# Patient Record
Sex: FEMALE | Race: BLACK OR AFRICAN AMERICAN | NOT HISPANIC OR LATINO | RURAL
[De-identification: names, ages, dates, MRNs, and addresses within clinical notes are randomized per-mention and may not be internally consistent; named-entity substitution may affect disease eponyms.]

---

## 2020-07-31 ENCOUNTER — HISTORICAL (OUTPATIENT)
Dept: ADMINISTRATIVE | Facility: HOSPITAL | Age: 20
End: 2020-07-31

## 2020-10-07 ENCOUNTER — HISTORICAL (OUTPATIENT)
Dept: ADMINISTRATIVE | Facility: HOSPITAL | Age: 20
End: 2020-10-07

## 2020-10-19 ENCOUNTER — HISTORICAL (OUTPATIENT)
Dept: ADMINISTRATIVE | Facility: HOSPITAL | Age: 20
End: 2020-10-19

## 2020-10-19 LAB
BILIRUB UR QL STRIP: ABNORMAL MG/DL
CLARITY UR: ABNORMAL
COLOR UR: ABNORMAL
GLUCOSE UR STRIP-MCNC: NEGATIVE MG/DL
KETONES UR STRIP-SCNC: NEGATIVE MG/DL
LEUKOCYTE ESTERASE UR QL STRIP: NEGATIVE LEU/UL
NITRITE UR QL STRIP: NEGATIVE
PH UR STRIP: 6 PH UNITS (ref 5–8)
PROT UR QL STRIP: 30 MG/DL
RBC # UR STRIP: NEGATIVE ERY/UL
SP GR UR STRIP: >1.03 (ref 1–1.03)
UROBILINOGEN UR STRIP-ACNC: 0.2 MG/DL

## 2020-10-20 ENCOUNTER — HISTORICAL (OUTPATIENT)
Dept: ADMINISTRATIVE | Facility: HOSPITAL | Age: 20
End: 2020-10-20

## 2020-10-20 LAB
BILIRUB UR QL STRIP: NEGATIVE MG/DL
CLARITY UR: CLEAR
COLOR UR: YELLOW
GLUCOSE UR STRIP-MCNC: NEGATIVE MG/DL
KETONES UR STRIP-SCNC: NEGATIVE MG/DL
LEUKOCYTE ESTERASE UR QL STRIP: NEGATIVE LEU/UL
NITRITE UR QL STRIP: NEGATIVE
PH UR STRIP: 7 PH UNITS (ref 5–8)
PROT UR QL STRIP: ABNORMAL MG/DL
RBC # UR STRIP: NEGATIVE ERY/UL
SP GR UR STRIP: 1.02 (ref 1–1.03)
UROBILINOGEN UR STRIP-ACNC: 4 EU/DL

## 2020-11-03 ENCOUNTER — HISTORICAL (OUTPATIENT)
Dept: ADMINISTRATIVE | Facility: HOSPITAL | Age: 20
End: 2020-11-03

## 2020-11-04 ENCOUNTER — HISTORICAL (OUTPATIENT)
Dept: ADMINISTRATIVE | Facility: HOSPITAL | Age: 20
End: 2020-11-04

## 2020-11-04 LAB — FIBRONECTIN FETAL SPEC QL: NEGATIVE

## 2020-11-12 ENCOUNTER — HISTORICAL (OUTPATIENT)
Dept: ADMINISTRATIVE | Facility: HOSPITAL | Age: 20
End: 2020-11-12

## 2020-11-19 ENCOUNTER — HISTORICAL (OUTPATIENT)
Dept: ADMINISTRATIVE | Facility: HOSPITAL | Age: 20
End: 2020-11-19

## 2020-11-24 ENCOUNTER — HISTORICAL (OUTPATIENT)
Dept: ADMINISTRATIVE | Facility: HOSPITAL | Age: 20
End: 2020-11-24

## 2020-12-27 ENCOUNTER — HISTORICAL (OUTPATIENT)
Dept: ADMINISTRATIVE | Facility: HOSPITAL | Age: 20
End: 2020-12-27

## 2020-12-27 LAB
25(OH)D3 SERPL-MCNC: 15.3 NG/ML
ABO: NORMAL
ALBUMIN SERPL BCP-MCNC: 2.8 G/DL (ref 3.5–5)
ALBUMIN/GLOB SERPL: 0.6 {RATIO}
ALP SERPL-CCNC: 150 U/L (ref 52–144)
ALT SERPL W P-5'-P-CCNC: 21 U/L (ref 13–56)
ANION GAP SERPL CALCULATED.3IONS-SCNC: 15 MMOL/L
ANTIBODY SCREEN: NORMAL
AST SERPL W P-5'-P-CCNC: 26 U/L (ref 15–37)
BACTERIA #/AREA URNS HPF: ABNORMAL /HPF
BASOPHILS # BLD AUTO: 0.02 X10E3/UL (ref 0–0.2)
BASOPHILS NFR BLD AUTO: 0.2 % (ref 0–1)
BILIRUB SERPL-MCNC: 0.2 MG/DL (ref 0–1.2)
BILIRUB UR QL STRIP: NEGATIVE MG/DL
BUN SERPL-MCNC: 6 MG/DL (ref 7–18)
BUN/CREAT SERPL: 10
CALCIUM SERPL-MCNC: 9 MG/DL (ref 8.5–10.1)
CHLORIDE SERPL-SCNC: 102 MMOL/L (ref 98–107)
CLARITY UR: ABNORMAL
CLARITY UR: ABNORMAL
CO2 SERPL-SCNC: 24 MMOL/L (ref 21–32)
COLOR UR: ABNORMAL
COLOR UR: ABNORMAL
CREAT SERPL-MCNC: 0.6 MG/DL (ref 0.55–1.02)
EOSINOPHIL # BLD AUTO: 0 X10E3/UL (ref 0–0.5)
EOSINOPHIL NFR BLD AUTO: 0 % (ref 1–4)
ERYTHROCYTE [DISTWIDTH] IN BLOOD BY AUTOMATED COUNT: 12.9 % (ref 11.5–14.5)
GLOBULIN SER-MCNC: 4.9 G/DL (ref 2–4)
GLUCOSE SERPL-MCNC: 123 MG/DL (ref 74–106)
GLUCOSE UR STRIP-MCNC: NEGATIVE MG/DL
HCT VFR BLD AUTO: 33.1 % (ref 38–47)
HGB BLD-MCNC: 11.6 G/DL (ref 12–16)
IMM GRANULOCYTES # BLD AUTO: 0.03 X10E3/UL (ref 0–0.04)
IMM GRANULOCYTES NFR BLD: 0.3 % (ref 0–0.4)
KETONES UR STRIP-SCNC: NEGATIVE MG/DL
LEUKOCYTE ESTERASE UR QL STRIP: ABNORMAL LEU/UL
LYMPHOCYTES # BLD AUTO: 0.79 X10E3/UL (ref 1–4.8)
LYMPHOCYTES NFR BLD AUTO: 8 % (ref 27–41)
MCH RBC QN AUTO: 31.8 PG (ref 27–31)
MCHC RBC AUTO-ENTMCNC: 35 G/DL (ref 32–36)
MCV RBC AUTO: 90.7 FL (ref 80–96)
MONOCYTES # BLD AUTO: 0.47 X10E3/UL (ref 0–0.8)
MONOCYTES NFR BLD AUTO: 4.8 % (ref 2–6)
MPC BLD CALC-MCNC: 11.4 FL (ref 9.4–12.4)
MUCOUS THREADS #/AREA URNS HPF: ABNORMAL /HPF
NEUTROPHILS # BLD AUTO: 8.55 X10E3/UL (ref 1.8–7.7)
NEUTROPHILS NFR BLD AUTO: 86.7 % (ref 53–65)
NITRITE UR QL STRIP: NEGATIVE
NRBC # BLD AUTO: 0 X10E3/UL (ref 0–0)
NRBC, AUTO (.00): 0 /100 (ref 0–0)
PH UR STRIP: 8 PH UNITS (ref 5–8)
PLATELET # BLD AUTO: 248 X10E3/UL (ref 150–400)
POTASSIUM SERPL-SCNC: 3.6 MMOL/L (ref 3.5–5.1)
PROT SERPL-MCNC: 7.7 G/DL (ref 6.4–8.2)
PROT UR QL STRIP: NEGATIVE MG/DL
RBC # BLD AUTO: 3.65 X10E6/UL (ref 4.2–5.4)
RBC # UR STRIP: ABNORMAL ERY/UL
RBC #/AREA URNS HPF: ABNORMAL /HPF (ref 0–3)
RH TYPE: NORMAL
SARS-COV-2 RNA AMPLIFICATION, QUAL: NEGATIVE
SODIUM SERPL-SCNC: 137 MMOL/L (ref 136–145)
SP GR UR STRIP: 1.01 (ref 1–1.03)
SQUAMOUS #/AREA URNS LPF: ABNORMAL /LPF
SYPHILIS AB INTERPRETATION: NORMAL
TSH SERPL DL<=0.005 MIU/L-ACNC: 0.84 UIU/ML (ref 0.36–3.74)
UROBILINOGEN UR STRIP-ACNC: 0.2 EU/DL
WBC # BLD AUTO: 9.86 X10E3/UL (ref 4.5–11)
WBC #/AREA URNS HPF: ABNORMAL /HPF (ref 0–5)

## 2020-12-28 ENCOUNTER — HISTORICAL (OUTPATIENT)
Dept: ADMINISTRATIVE | Facility: HOSPITAL | Age: 20
End: 2020-12-28

## 2020-12-28 LAB
BASOPHILS # BLD AUTO: 0.03 X10E3/UL (ref 0–0.2)
BASOPHILS NFR BLD AUTO: 0.4 % (ref 0–1)
EOSINOPHIL # BLD AUTO: 0.04 X10E3/UL (ref 0–0.5)
EOSINOPHIL NFR BLD AUTO: 0.5 % (ref 1–4)
ERYTHROCYTE [DISTWIDTH] IN BLOOD BY AUTOMATED COUNT: 13.5 % (ref 11.5–14.5)
HCT VFR BLD AUTO: 31.3 % (ref 38–47)
HGB BLD-MCNC: 10.7 G/DL (ref 12–16)
IMM GRANULOCYTES # BLD AUTO: 0.03 X10E3/UL (ref 0–0.04)
IMM GRANULOCYTES NFR BLD: 0.4 % (ref 0–0.4)
LYMPHOCYTES # BLD AUTO: 1.63 X10E3/UL (ref 1–4.8)
LYMPHOCYTES NFR BLD AUTO: 21.3 % (ref 27–41)
MCH RBC QN AUTO: 31.4 PG (ref 27–31)
MCHC RBC AUTO-ENTMCNC: 34.2 G/DL (ref 32–36)
MCV RBC AUTO: 91.8 FL (ref 80–96)
MONOCYTES # BLD AUTO: 0.83 X10E3/UL (ref 0–0.8)
MONOCYTES NFR BLD AUTO: 10.8 % (ref 2–6)
MPC BLD CALC-MCNC: 11.1 FL (ref 9.4–12.4)
NEUTROPHILS # BLD AUTO: 5.11 X10E3/UL (ref 1.8–7.7)
NEUTROPHILS NFR BLD AUTO: 66.6 % (ref 53–65)
NRBC # BLD AUTO: 0 X10E3/UL (ref 0–0)
NRBC, AUTO (.00): 0 /100 (ref 0–0)
PLATELET # BLD AUTO: 229 X10E3/UL (ref 150–400)
RBC # BLD AUTO: 3.41 X10E6/UL (ref 4.2–5.4)
WBC # BLD AUTO: 7.67 X10E3/UL (ref 4.5–11)

## 2020-12-30 LAB
INSULIN SERPL-ACNC: NORMAL U[IU]/ML
LAB AP CLINICAL INFORMATION: NORMAL
LAB AP COMMENTS: NORMAL
LAB AP DIAGNOSIS - HISTORICAL: NORMAL
LAB AP GROSS PATHOLOGY - HISTORICAL: NORMAL
LAB AP SPECIMEN SUBMITTED - HISTORICAL: NORMAL

## 2020-12-31 LAB
ANA SER QL: NEGATIVE
HSV IGM SER QL IA: ABNORMAL
HSV TYPE 1 AB IGG INDEX: >6.93
HSV TYPE 2 AB IGG INDEX: 0.11
HSV1 IGG SER QL: POSITIVE
HSV2 IGG SER QL: NEGATIVE

## 2021-04-08 ENCOUNTER — OFFICE VISIT (OUTPATIENT)
Dept: FAMILY MEDICINE | Facility: CLINIC | Age: 21
End: 2021-04-08
Payer: MEDICAID

## 2021-04-08 VITALS
OXYGEN SATURATION: 100 % | HEIGHT: 69 IN | DIASTOLIC BLOOD PRESSURE: 78 MMHG | BODY MASS INDEX: 33.23 KG/M2 | SYSTOLIC BLOOD PRESSURE: 128 MMHG | RESPIRATION RATE: 18 BRPM | WEIGHT: 224.38 LBS | HEART RATE: 96 BPM | TEMPERATURE: 98 F

## 2021-04-08 DIAGNOSIS — J30.2 SEASONAL ALLERGIC RHINITIS, UNSPECIFIED TRIGGER: ICD-10-CM

## 2021-04-08 DIAGNOSIS — Z72.53 HIGH RISK BISEXUAL BEHAVIOR: Primary | ICD-10-CM

## 2021-04-08 DIAGNOSIS — N91.2 AMENORRHEA: ICD-10-CM

## 2021-04-08 LAB
B-HCG UR QL: NEGATIVE
CTP QC/QA: YES

## 2021-04-08 PROCEDURE — 81025 URINE PREGNANCY TEST: CPT | Mod: ,,, | Performed by: NURSE PRACTITIONER

## 2021-04-08 PROCEDURE — 99213 PR OFFICE/OUTPT VISIT, EST, LEVL III, 20-29 MIN: ICD-10-PCS | Mod: ,,, | Performed by: NURSE PRACTITIONER

## 2021-04-08 PROCEDURE — 99213 OFFICE O/P EST LOW 20 MIN: CPT | Mod: ,,, | Performed by: NURSE PRACTITIONER

## 2021-04-08 PROCEDURE — 81025 POCT URINE PREGNANCY: ICD-10-PCS | Mod: ,,, | Performed by: NURSE PRACTITIONER

## 2021-04-08 RX ORDER — CETIRIZINE HYDROCHLORIDE 10 MG/1
10 TABLET ORAL DAILY
Qty: 90 TABLET | Refills: 3 | Status: SHIPPED | OUTPATIENT
Start: 2021-04-08 | End: 2021-06-09

## 2021-04-09 LAB
CHLAMYDIA BY PCR: NEGATIVE
N. GONORRHOEAE (GC) BY PCR: NEGATIVE

## 2021-04-14 ENCOUNTER — OFFICE VISIT (OUTPATIENT)
Dept: FAMILY MEDICINE | Facility: CLINIC | Age: 21
End: 2021-04-14

## 2021-04-14 VITALS
BODY MASS INDEX: 34.21 KG/M2 | RESPIRATION RATE: 16 BRPM | HEIGHT: 69 IN | OXYGEN SATURATION: 99 % | HEART RATE: 78 BPM | WEIGHT: 231 LBS | TEMPERATURE: 98 F

## 2021-04-14 DIAGNOSIS — R60.9 EDEMA, UNSPECIFIED TYPE: ICD-10-CM
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
## 2021-04-14 PROCEDURE — 99214 OFFICE O/P EST MOD 30 MIN: CPT | Mod: 25,,, | Performed by: FAMILY MEDICINE

## 2021-04-14 PROCEDURE — 96372 PR INJECTION,THERAP/PROPH/DIAG2ST, IM OR SUBCUT: ICD-10-PCS | Mod: ,,, | Performed by: FAMILY MEDICINE

## 2021-04-14 PROCEDURE — 99214 PR OFFICE/OUTPT VISIT, EST, LEVL IV, 30-39 MIN: ICD-10-PCS | Mod: 25,,, | Performed by: FAMILY MEDICINE

## 2021-04-14 PROCEDURE — 96372 THER/PROPH/DIAG INJ SC/IM: CPT | Mod: ,,, | Performed by: FAMILY MEDICINE

## 2021-04-14 RX ORDER — CLINDAMYCIN HYDROCHLORIDE 300 MG/1
300 CAPSULE ORAL 3 TIMES DAILY
Qty: 21 CAPSULE | Refills: 0 | Status: SHIPPED | OUTPATIENT
Start: 2021-04-14 | End: 2021-04-21

## 2021-04-14 RX ORDER — PREDNISONE 20 MG/1
20 TABLET ORAL DAILY
Qty: 5 TABLET | Refills: 0 | Status: SHIPPED | OUTPATIENT
Start: 2021-04-14 | End: 2021-04-19

## 2021-04-14 RX ORDER — CLINDAMYCIN PHOSPHATE 150 MG/ML
300 INJECTION, SOLUTION INTRAVENOUS
Status: COMPLETED | OUTPATIENT
Start: 2021-04-14 | End: 2021-04-14

## 2021-04-14 RX ORDER — KETOROLAC TROMETHAMINE 30 MG/ML
60 INJECTION, SOLUTION INTRAMUSCULAR; INTRAVENOUS
Status: COMPLETED | OUTPATIENT
Start: 2021-04-14 | End: 2021-04-14

## 2021-04-14 RX ADMIN — CLINDAMYCIN PHOSPHATE 300 MG: 150 INJECTION, SOLUTION INTRAVENOUS at 07:04

## 2021-04-14 RX ADMIN — KETOROLAC TROMETHAMINE 60 MG: 30 INJECTION, SOLUTION INTRAMUSCULAR; INTRAVENOUS at 07:04

## 2021-06-09 ENCOUNTER — OFFICE VISIT (OUTPATIENT)
Dept: PRIMARY CARE CLINIC | Facility: CLINIC | Age: 21
End: 2021-06-09
Payer: MEDICAID

## 2021-06-09 VITALS
SYSTOLIC BLOOD PRESSURE: 108 MMHG | OXYGEN SATURATION: 99 % | TEMPERATURE: 98 F | BODY MASS INDEX: 32.88 KG/M2 | WEIGHT: 222 LBS | RESPIRATION RATE: 18 BRPM | HEIGHT: 69 IN | DIASTOLIC BLOOD PRESSURE: 72 MMHG | HEART RATE: 80 BPM

## 2021-06-09 DIAGNOSIS — N89.8 VAGINAL DISCHARGE: Primary | ICD-10-CM

## 2021-06-09 DIAGNOSIS — Z11.3 SCREENING EXAMINATION FOR STD (SEXUALLY TRANSMITTED DISEASE): ICD-10-CM

## 2021-06-09 PROCEDURE — 99212 OFFICE O/P EST SF 10 MIN: CPT | Mod: ,,, | Performed by: NURSE PRACTITIONER

## 2021-06-09 PROCEDURE — 99212 PR OFFICE/OUTPT VISIT, EST, LEVL II, 10-19 MIN: ICD-10-PCS | Mod: ,,, | Performed by: NURSE PRACTITIONER

## 2021-06-10 ENCOUNTER — PATIENT MESSAGE (OUTPATIENT)
Dept: PRIMARY CARE CLINIC | Facility: CLINIC | Age: 21
End: 2021-06-10

## 2021-06-10 DIAGNOSIS — N76.0 ACUTE VAGINITIS: Primary | ICD-10-CM

## 2021-06-10 RX ORDER — METRONIDAZOLE 500 MG/1
500 TABLET ORAL EVERY 12 HOURS
Qty: 14 TABLET | Refills: 0 | Status: SHIPPED | OUTPATIENT
Start: 2021-06-10 | End: 2021-06-17

## 2021-07-01 ENCOUNTER — PATIENT MESSAGE (OUTPATIENT)
Dept: ADMINISTRATIVE | Facility: OTHER | Age: 21
End: 2021-07-01

## 2021-08-09 ENCOUNTER — OFFICE VISIT (OUTPATIENT)
Dept: FAMILY MEDICINE | Facility: CLINIC | Age: 21
End: 2021-08-09
Payer: MEDICAID

## 2021-08-09 VITALS
RESPIRATION RATE: 18 BRPM | OXYGEN SATURATION: 100 % | DIASTOLIC BLOOD PRESSURE: 78 MMHG | BODY MASS INDEX: 32.73 KG/M2 | SYSTOLIC BLOOD PRESSURE: 110 MMHG | WEIGHT: 221 LBS | TEMPERATURE: 98 F | HEIGHT: 69 IN | HEART RATE: 80 BPM

## 2021-08-09 DIAGNOSIS — L50.9 URTICARIA: ICD-10-CM

## 2021-08-09 DIAGNOSIS — H61.23 BILATERAL IMPACTED CERUMEN: Primary | ICD-10-CM

## 2021-08-09 PROCEDURE — 99213 PR OFFICE/OUTPT VISIT, EST, LEVL III, 20-29 MIN: ICD-10-PCS | Mod: ,,, | Performed by: NURSE PRACTITIONER

## 2021-08-09 PROCEDURE — 99213 OFFICE O/P EST LOW 20 MIN: CPT | Mod: ,,, | Performed by: NURSE PRACTITIONER

## 2021-08-09 RX ORDER — CETIRIZINE HYDROCHLORIDE 10 MG/1
10 TABLET ORAL DAILY
Qty: 30 TABLET | Refills: 0 | Status: SHIPPED | OUTPATIENT
Start: 2021-08-09 | End: 2021-09-08

## 2021-08-11 ENCOUNTER — OFFICE VISIT (OUTPATIENT)
Dept: FAMILY MEDICINE | Facility: CLINIC | Age: 21
End: 2021-08-11
Payer: MEDICAID

## 2021-08-11 DIAGNOSIS — Z11.52 ENCOUNTER FOR SCREENING FOR COVID-19: Primary | ICD-10-CM

## 2021-08-11 PROCEDURE — 99202 OFFICE O/P NEW SF 15 MIN: CPT | Mod: GC,,, | Performed by: FAMILY MEDICINE

## 2021-08-11 PROCEDURE — 99202 PR OFFICE/OUTPT VISIT, NEW, LEVL II, 15-29 MIN: ICD-10-PCS | Mod: GC,,, | Performed by: FAMILY MEDICINE

## 2021-08-11 PROCEDURE — 87635 SARS-COV-2 (COVID-19) QUALITATIVE PCR: ICD-10-PCS | Mod: ,,, | Performed by: CLINICAL MEDICAL LABORATORY

## 2021-08-11 PROCEDURE — 87635 SARS-COV-2 COVID-19 AMP PRB: CPT | Mod: ,,, | Performed by: CLINICAL MEDICAL LABORATORY

## 2021-08-12 LAB — SARS-COV-2 RNA RESP QL NAA+PROBE: NEGATIVE

## 2021-08-23 ENCOUNTER — OFFICE VISIT (OUTPATIENT)
Dept: FAMILY MEDICINE | Facility: CLINIC | Age: 21
End: 2021-08-23
Payer: MEDICAID

## 2021-08-23 VITALS
DIASTOLIC BLOOD PRESSURE: 73 MMHG | HEART RATE: 73 BPM | TEMPERATURE: 97 F | HEIGHT: 69 IN | SYSTOLIC BLOOD PRESSURE: 128 MMHG | BODY MASS INDEX: 33.38 KG/M2 | RESPIRATION RATE: 17 BRPM | WEIGHT: 225.38 LBS | OXYGEN SATURATION: 100 %

## 2021-08-23 DIAGNOSIS — S99.929A INJURY OF NAIL BED OF TOE: Primary | ICD-10-CM

## 2021-08-23 PROCEDURE — 99213 OFFICE O/P EST LOW 20 MIN: CPT | Mod: ,,, | Performed by: NURSE PRACTITIONER

## 2021-08-23 PROCEDURE — 99213 PR OFFICE/OUTPT VISIT, EST, LEVL III, 20-29 MIN: ICD-10-PCS | Mod: ,,, | Performed by: NURSE PRACTITIONER

## 2021-08-23 RX ORDER — MUPIROCIN 20 MG/G
OINTMENT TOPICAL 3 TIMES DAILY
Qty: 15 G | Refills: 0 | Status: SHIPPED | OUTPATIENT
Start: 2021-08-23 | End: 2021-08-27

## 2021-08-27 ENCOUNTER — OFFICE VISIT (OUTPATIENT)
Dept: FAMILY MEDICINE | Facility: CLINIC | Age: 21
End: 2021-08-27
Payer: MEDICAID

## 2021-08-27 VITALS
SYSTOLIC BLOOD PRESSURE: 136 MMHG | RESPIRATION RATE: 16 BRPM | OXYGEN SATURATION: 99 % | HEART RATE: 86 BPM | HEIGHT: 69 IN | DIASTOLIC BLOOD PRESSURE: 64 MMHG | TEMPERATURE: 98 F | WEIGHT: 221 LBS | BODY MASS INDEX: 32.73 KG/M2

## 2021-08-27 DIAGNOSIS — Z00.00 ANNUAL PHYSICAL EXAM: Primary | ICD-10-CM

## 2021-08-27 DIAGNOSIS — Z01.419 PAP SMEAR, AS PART OF ROUTINE GYNECOLOGICAL EXAMINATION: ICD-10-CM

## 2021-08-27 LAB
B-HCG UR QL: NEGATIVE
BILIRUB SERPL-MCNC: NORMAL MG/DL
BLOOD URINE, POC: NEGATIVE
CANDIDA SPECIES: POSITIVE
CHOLEST SERPL-MCNC: 143 MG/DL (ref 0–200)
CHOLEST/HDLC SERPL: 2.9 {RATIO}
COLOR, POC UA: YELLOW
CTP QC/QA: YES
EST. AVERAGE GLUCOSE BLD GHB EST-MCNC: 94 MG/DL
GARDNERELLA: POSITIVE
GLUCOSE UR QL STRIP: NEGATIVE
HBA1C MFR BLD HPLC: 5.4 % (ref 4.5–6.6)
HDLC SERPL-MCNC: 49 MG/DL (ref 40–60)
KETONES UR QL STRIP: NEGATIVE
LDLC SERPL CALC-MCNC: 82 MG/DL
LDLC/HDLC SERPL: 1.7 {RATIO}
LEUKOCYTE ESTERASE URINE, POC: NEGATIVE
NITRITE, POC UA: NEGATIVE
NONHDLC SERPL-MCNC: 94 MG/DL
PH, POC UA: 6
PROTEIN, POC: NEGATIVE
SPECIFIC GRAVITY, POC UA: 1.03
TRICHOMONAS: NEGATIVE
TRIGL SERPL-MCNC: 62 MG/DL (ref 35–150)
UROBILINOGEN, POC UA: 2
VLDLC SERPL-MCNC: 12 MG/DL

## 2021-08-27 PROCEDURE — 99395 PREV VISIT EST AGE 18-39: CPT | Mod: ,,, | Performed by: NURSE PRACTITIONER

## 2021-08-27 PROCEDURE — 87591 N.GONORRHOEAE DNA AMP PROB: CPT | Mod: ,,, | Performed by: CLINICAL MEDICAL LABORATORY

## 2021-08-27 PROCEDURE — 87480 BACTERIAL VAGINOSIS: ICD-10-PCS | Mod: ,,, | Performed by: CLINICAL MEDICAL LABORATORY

## 2021-08-27 PROCEDURE — 83036 HEMOGLOBIN GLYCOSYLATED A1C: CPT | Mod: ,,, | Performed by: CLINICAL MEDICAL LABORATORY

## 2021-08-27 PROCEDURE — 88141 CYTOPATH C/V INTERPRET: CPT | Mod: ,,, | Performed by: PATHOLOGY

## 2021-08-27 PROCEDURE — 81025 URINE PREGNANCY TEST: CPT | Mod: RHCUB | Performed by: NURSE PRACTITIONER

## 2021-08-27 PROCEDURE — 87480 CANDIDA DNA DIR PROBE: CPT | Mod: ,,, | Performed by: CLINICAL MEDICAL LABORATORY

## 2021-08-27 PROCEDURE — 87591 CHLAMYDIA/GONORRHOEAE(GC), PCR: ICD-10-PCS | Mod: ,,, | Performed by: CLINICAL MEDICAL LABORATORY

## 2021-08-27 PROCEDURE — 87660 BACTERIAL VAGINOSIS: ICD-10-PCS | Mod: ,,, | Performed by: CLINICAL MEDICAL LABORATORY

## 2021-08-27 PROCEDURE — 87624 HUMAN PAPILLOMAVIRUS (HPV): ICD-10-PCS | Mod: ,,, | Performed by: CLINICAL MEDICAL LABORATORY

## 2021-08-27 PROCEDURE — 87491 CHLAMYDIA/GONORRHOEAE(GC), PCR: ICD-10-PCS | Mod: ,,, | Performed by: CLINICAL MEDICAL LABORATORY

## 2021-08-27 PROCEDURE — 81003 URINALYSIS AUTO W/O SCOPE: CPT | Mod: RHCUB | Performed by: NURSE PRACTITIONER

## 2021-08-27 PROCEDURE — 83036 HEMOGLOBIN A1C: ICD-10-PCS | Mod: ,,, | Performed by: CLINICAL MEDICAL LABORATORY

## 2021-08-27 PROCEDURE — 80061 LIPID PANEL: ICD-10-PCS | Mod: ,,, | Performed by: CLINICAL MEDICAL LABORATORY

## 2021-08-27 PROCEDURE — 88141 THINPREP PAP TEST: ICD-10-PCS | Mod: ,,, | Performed by: PATHOLOGY

## 2021-08-27 PROCEDURE — 87624 HPV HI-RISK TYP POOLED RSLT: CPT | Mod: ,,, | Performed by: CLINICAL MEDICAL LABORATORY

## 2021-08-27 PROCEDURE — 87660 TRICHOMONAS VAGIN DIR PROBE: CPT | Mod: ,,, | Performed by: CLINICAL MEDICAL LABORATORY

## 2021-08-27 PROCEDURE — 88142 CYTOPATH C/V THIN LAYER: CPT | Mod: GCY | Performed by: NURSE PRACTITIONER

## 2021-08-27 PROCEDURE — 87510 GARDNER VAG DNA DIR PROBE: CPT | Mod: ,,, | Performed by: CLINICAL MEDICAL LABORATORY

## 2021-08-27 PROCEDURE — 87510 BACTERIAL VAGINOSIS: ICD-10-PCS | Mod: ,,, | Performed by: CLINICAL MEDICAL LABORATORY

## 2021-08-27 PROCEDURE — 80061 LIPID PANEL: CPT | Mod: ,,, | Performed by: CLINICAL MEDICAL LABORATORY

## 2021-08-27 PROCEDURE — 99395 PR PREVENTIVE VISIT,EST,18-39: ICD-10-PCS | Mod: ,,, | Performed by: NURSE PRACTITIONER

## 2021-08-27 PROCEDURE — 87491 CHLMYD TRACH DNA AMP PROBE: CPT | Mod: ,,, | Performed by: CLINICAL MEDICAL LABORATORY

## 2021-08-27 RX ORDER — METRONIDAZOLE 500 MG/1
500 TABLET ORAL EVERY 12 HOURS
Qty: 14 TABLET | Refills: 0 | Status: SHIPPED | OUTPATIENT
Start: 2021-08-27 | End: 2021-09-03

## 2021-08-27 RX ORDER — FLUCONAZOLE 50 MG/1
150 TABLET ORAL DAILY
Qty: 3 TABLET | Refills: 0 | Status: SHIPPED | OUTPATIENT
Start: 2021-08-27 | End: 2021-08-28

## 2021-08-28 LAB
CHLAMYDIA BY PCR: NEGATIVE
N. GONORRHOEAE (GC) BY PCR: NEGATIVE

## 2021-09-01 ENCOUNTER — TELEPHONE (OUTPATIENT)
Dept: HEPATOLOGY | Facility: HOSPITAL | Age: 21
End: 2021-09-01

## 2021-09-01 DIAGNOSIS — R87.629 ABNORMAL VAGINAL PAP SMEAR: Primary | ICD-10-CM

## 2021-09-01 LAB
GH SERPL-MCNC: ABNORMAL NG/ML
INSULIN SERPL-ACNC: ABNORMAL U[IU]/ML
LAB AP CLINICAL INFORMATION: ABNORMAL
LAB AP GYN INTERPRETATION: ABNORMAL
LAB AP PAP DISCLAIMER COMMENTS: ABNORMAL
RENIN PLAS-CCNC: ABNORMAL NG/ML/H

## 2021-09-02 LAB
HPV 16: NEGATIVE
HPV 18: NEGATIVE
HPV OTHER: POSITIVE

## 2021-09-27 ENCOUNTER — OFFICE VISIT (OUTPATIENT)
Dept: FAMILY MEDICINE | Facility: CLINIC | Age: 21
End: 2021-09-27
Payer: MEDICAID

## 2021-09-27 VITALS
HEIGHT: 69 IN | DIASTOLIC BLOOD PRESSURE: 47 MMHG | RESPIRATION RATE: 16 BRPM | WEIGHT: 215.38 LBS | BODY MASS INDEX: 31.9 KG/M2 | OXYGEN SATURATION: 99 % | SYSTOLIC BLOOD PRESSURE: 118 MMHG | HEART RATE: 81 BPM | TEMPERATURE: 98 F

## 2021-09-27 DIAGNOSIS — S99.929A INJURY OF NAIL BED OF TOE: ICD-10-CM

## 2021-09-27 DIAGNOSIS — R10.9 ABDOMINAL PAIN, UNSPECIFIED ABDOMINAL LOCATION: Primary | ICD-10-CM

## 2021-09-27 LAB
BILIRUB SERPL-MCNC: NEGATIVE MG/DL
BLOOD URINE, POC: NEGATIVE
COLOR, POC UA: YELLOW
GLUCOSE UR QL STRIP: NEGATIVE
KETONES UR QL STRIP: NEGATIVE
LEUKOCYTE ESTERASE URINE, POC: NORMAL
NITRITE, POC UA: NEGATIVE
PH, POC UA: 6
PROTEIN, POC: NEGATIVE
SPECIFIC GRAVITY, POC UA: 1.02
UROBILINOGEN, POC UA: 1

## 2021-09-27 PROCEDURE — 81003 URINALYSIS AUTO W/O SCOPE: CPT | Mod: RHCUB | Performed by: NURSE PRACTITIONER

## 2021-09-27 PROCEDURE — 87086 CULTURE, URINE: ICD-10-PCS | Mod: ,,, | Performed by: CLINICAL MEDICAL LABORATORY

## 2021-09-27 PROCEDURE — 87077 CULTURE AEROBIC IDENTIFY: CPT | Mod: ,,, | Performed by: CLINICAL MEDICAL LABORATORY

## 2021-09-27 PROCEDURE — 87086 URINE CULTURE/COLONY COUNT: CPT | Mod: ,,, | Performed by: CLINICAL MEDICAL LABORATORY

## 2021-09-27 PROCEDURE — 87186 SC STD MICRODIL/AGAR DIL: CPT | Mod: ,,, | Performed by: CLINICAL MEDICAL LABORATORY

## 2021-09-27 PROCEDURE — 99213 OFFICE O/P EST LOW 20 MIN: CPT | Mod: ,,, | Performed by: NURSE PRACTITIONER

## 2021-09-27 PROCEDURE — 87186 CULTURE, URINE: ICD-10-PCS | Mod: ,,, | Performed by: CLINICAL MEDICAL LABORATORY

## 2021-09-27 PROCEDURE — 99213 PR OFFICE/OUTPT VISIT, EST, LEVL III, 20-29 MIN: ICD-10-PCS | Mod: ,,, | Performed by: NURSE PRACTITIONER

## 2021-09-27 PROCEDURE — 87077 CULTURE, URINE: ICD-10-PCS | Mod: ,,, | Performed by: CLINICAL MEDICAL LABORATORY

## 2021-09-27 RX ORDER — POLYETHYLENE GLYCOL 3350 17 G/17G
17 POWDER, FOR SOLUTION ORAL DAILY
Qty: 119 G | Refills: 0 | Status: SHIPPED | OUTPATIENT
Start: 2021-09-27 | End: 2022-09-22

## 2021-09-30 LAB — UA COMPLETE W REFLEX CULTURE PNL UR: ABNORMAL

## 2021-09-30 RX ORDER — CEPHALEXIN 500 MG/1
500 CAPSULE ORAL EVERY 12 HOURS
Qty: 14 CAPSULE | Refills: 0 | Status: SHIPPED | OUTPATIENT
Start: 2021-09-30 | End: 2021-10-07

## 2021-10-19 ENCOUNTER — OFFICE VISIT (OUTPATIENT)
Dept: OBSTETRICS AND GYNECOLOGY | Facility: CLINIC | Age: 21
End: 2021-10-19
Payer: MEDICAID

## 2021-10-19 VITALS
TEMPERATURE: 98 F | DIASTOLIC BLOOD PRESSURE: 71 MMHG | HEIGHT: 69 IN | BODY MASS INDEX: 31.55 KG/M2 | RESPIRATION RATE: 18 BRPM | SYSTOLIC BLOOD PRESSURE: 108 MMHG | HEART RATE: 69 BPM | WEIGHT: 213 LBS

## 2021-10-19 DIAGNOSIS — R87.612 LGSIL ON PAP SMEAR OF CERVIX: Primary | ICD-10-CM

## 2021-10-19 PROCEDURE — 99203 OFFICE O/P NEW LOW 30 MIN: CPT | Mod: ,,, | Performed by: OBSTETRICS & GYNECOLOGY

## 2021-10-19 PROCEDURE — 99203 PR OFFICE/OUTPT VISIT, NEW, LEVL III, 30-44 MIN: ICD-10-PCS | Mod: ,,, | Performed by: OBSTETRICS & GYNECOLOGY

## 2021-11-01 ENCOUNTER — PROCEDURE VISIT (OUTPATIENT)
Dept: OBSTETRICS AND GYNECOLOGY | Facility: CLINIC | Age: 21
End: 2021-11-01
Payer: MEDICAID

## 2021-11-01 VITALS
TEMPERATURE: 98 F | DIASTOLIC BLOOD PRESSURE: 66 MMHG | BODY MASS INDEX: 31.7 KG/M2 | HEART RATE: 77 BPM | HEIGHT: 69 IN | RESPIRATION RATE: 18 BRPM | WEIGHT: 214 LBS | SYSTOLIC BLOOD PRESSURE: 116 MMHG

## 2021-11-01 DIAGNOSIS — R87.612 LGSIL ON PAP SMEAR OF CERVIX: Primary | ICD-10-CM

## 2021-11-01 DIAGNOSIS — Z72.51 HIGH RISK SEXUAL BEHAVIOR, UNSPECIFIED TYPE: ICD-10-CM

## 2021-11-01 DIAGNOSIS — R87.618 PAP SMEAR ABNORMALITY OF CERVIX/HUMAN PAPILLOMAVIRUS (HPV) POSITIVE: ICD-10-CM

## 2021-11-01 DIAGNOSIS — N89.8 VAGINAL ODOR: ICD-10-CM

## 2021-11-01 DIAGNOSIS — N89.8 VAGINAL DISCHARGE: ICD-10-CM

## 2021-11-01 DIAGNOSIS — Z11.3 SCREEN FOR STD (SEXUALLY TRANSMITTED DISEASE): ICD-10-CM

## 2021-11-01 LAB
B-HCG UR QL: NEGATIVE
CANDIDA SPECIES: POSITIVE
CTP QC/QA: YES
GARDNERELLA: POSITIVE
TRICHOMONAS: POSITIVE

## 2021-11-01 PROCEDURE — 87510 GARDNER VAG DNA DIR PROBE: CPT | Mod: ,,, | Performed by: CLINICAL MEDICAL LABORATORY

## 2021-11-01 PROCEDURE — 87480 BACTERIAL VAGINOSIS: ICD-10-PCS | Mod: ,,, | Performed by: CLINICAL MEDICAL LABORATORY

## 2021-11-01 PROCEDURE — 87591 N.GONORRHOEAE DNA AMP PROB: CPT | Mod: ,,, | Performed by: CLINICAL MEDICAL LABORATORY

## 2021-11-01 PROCEDURE — 88342 IMHCHEM/IMCYTCHM 1ST ANTB: CPT | Mod: 26,,, | Performed by: PATHOLOGY

## 2021-11-01 PROCEDURE — 88342 SURGICAL PATHOLOGY: ICD-10-PCS | Mod: 26,,, | Performed by: PATHOLOGY

## 2021-11-01 PROCEDURE — 87660 BACTERIAL VAGINOSIS: ICD-10-PCS | Mod: ,,, | Performed by: CLINICAL MEDICAL LABORATORY

## 2021-11-01 PROCEDURE — 87660 TRICHOMONAS VAGIN DIR PROBE: CPT | Mod: ,,, | Performed by: CLINICAL MEDICAL LABORATORY

## 2021-11-01 PROCEDURE — 81025 URINE PREGNANCY TEST: CPT | Mod: QW,,, | Performed by: OBSTETRICS & GYNECOLOGY

## 2021-11-01 PROCEDURE — 88305 SURGICAL PATHOLOGY: ICD-10-PCS | Mod: 26,XU,, | Performed by: PATHOLOGY

## 2021-11-01 PROCEDURE — 87510 BACTERIAL VAGINOSIS: ICD-10-PCS | Mod: ,,, | Performed by: CLINICAL MEDICAL LABORATORY

## 2021-11-01 PROCEDURE — 87491 CHLAMYDIA/GONORRHOEAE(GC), PCR: ICD-10-PCS | Mod: ,,, | Performed by: CLINICAL MEDICAL LABORATORY

## 2021-11-01 PROCEDURE — 57454 COLPOSCOPY: ICD-10-PCS | Mod: ,,, | Performed by: OBSTETRICS & GYNECOLOGY

## 2021-11-01 PROCEDURE — 88305 TISSUE EXAM BY PATHOLOGIST: CPT | Mod: 26,XU,, | Performed by: PATHOLOGY

## 2021-11-01 PROCEDURE — 87480 CANDIDA DNA DIR PROBE: CPT | Mod: ,,, | Performed by: CLINICAL MEDICAL LABORATORY

## 2021-11-01 PROCEDURE — 87591 CHLAMYDIA/GONORRHOEAE(GC), PCR: ICD-10-PCS | Mod: ,,, | Performed by: CLINICAL MEDICAL LABORATORY

## 2021-11-01 PROCEDURE — 57454 BX/CURETT OF CERVIX W/SCOPE: CPT | Mod: ,,, | Performed by: OBSTETRICS & GYNECOLOGY

## 2021-11-01 PROCEDURE — 81025 POCT URINE PREGNANCY: ICD-10-PCS | Mod: QW,,, | Performed by: OBSTETRICS & GYNECOLOGY

## 2021-11-01 PROCEDURE — 88305 TISSUE EXAM BY PATHOLOGIST: CPT | Mod: SUR | Performed by: OBSTETRICS & GYNECOLOGY

## 2021-11-01 PROCEDURE — 87491 CHLMYD TRACH DNA AMP PROBE: CPT | Mod: ,,, | Performed by: CLINICAL MEDICAL LABORATORY

## 2021-11-01 RX ORDER — METRONIDAZOLE 500 MG/1
500 TABLET ORAL 2 TIMES DAILY
Qty: 14 TABLET | Refills: 0 | Status: SHIPPED | OUTPATIENT
Start: 2021-11-01 | End: 2021-11-08

## 2021-11-02 LAB
CHLAMYDIA BY PCR: NEGATIVE
N. GONORRHOEAE (GC) BY PCR: NEGATIVE

## 2021-11-03 LAB
DHEA SERPL-MCNC: NORMAL
ESTROGEN SERPL-MCNC: NORMAL PG/ML
LAB AP CLINICAL INFORMATION: NORMAL
LAB AP GROSS DESCRIPTION: NORMAL
LAB AP LABORATORY NOTES: NORMAL
T3RU NFR SERPL: NORMAL %

## 2021-11-15 ENCOUNTER — OFFICE VISIT (OUTPATIENT)
Dept: OBSTETRICS AND GYNECOLOGY | Facility: CLINIC | Age: 21
End: 2021-11-15
Payer: MEDICAID

## 2021-11-15 VITALS
SYSTOLIC BLOOD PRESSURE: 112 MMHG | BODY MASS INDEX: 31.55 KG/M2 | RESPIRATION RATE: 18 BRPM | HEART RATE: 58 BPM | HEIGHT: 69 IN | DIASTOLIC BLOOD PRESSURE: 63 MMHG | WEIGHT: 213 LBS | TEMPERATURE: 98 F

## 2021-11-15 DIAGNOSIS — D06.9 HIGH GRADE SQUAMOUS INTRAEPITHELIAL LESION (HGSIL), GRADE 3 CIN, ON BIOPSY OF CERVIX: Primary | ICD-10-CM

## 2021-11-15 DIAGNOSIS — N87.1 HIGH GRADE SQUAMOUS INTRAEPITHELIAL LESION (HGSIL), GRADE 2 CIN, ON BIOPSY OF CERVIX: ICD-10-CM

## 2021-11-15 PROCEDURE — 99214 OFFICE O/P EST MOD 30 MIN: CPT | Mod: ,,, | Performed by: OBSTETRICS & GYNECOLOGY

## 2021-11-15 PROCEDURE — 99214 PR OFFICE/OUTPT VISIT, EST, LEVL IV, 30-39 MIN: ICD-10-PCS | Mod: ,,, | Performed by: OBSTETRICS & GYNECOLOGY

## 2021-12-06 ENCOUNTER — PROCEDURE VISIT (OUTPATIENT)
Dept: OBSTETRICS AND GYNECOLOGY | Facility: CLINIC | Age: 21
End: 2021-12-06
Payer: MEDICAID

## 2021-12-06 VITALS
TEMPERATURE: 97 F | BODY MASS INDEX: 31.1 KG/M2 | WEIGHT: 210 LBS | DIASTOLIC BLOOD PRESSURE: 65 MMHG | RESPIRATION RATE: 18 BRPM | HEIGHT: 69 IN | HEART RATE: 65 BPM | SYSTOLIC BLOOD PRESSURE: 112 MMHG

## 2021-12-06 DIAGNOSIS — R87.613 PAP SMEAR ABNORMALITY OF CERVIX WITH HGSIL: Primary | ICD-10-CM

## 2021-12-06 PROCEDURE — 88307 SURGICAL PATHOLOGY: ICD-10-PCS | Mod: 26,,, | Performed by: PATHOLOGY

## 2021-12-06 PROCEDURE — 96372 THER/PROPH/DIAG INJ SC/IM: CPT | Mod: 59,,, | Performed by: OBSTETRICS & GYNECOLOGY

## 2021-12-06 PROCEDURE — 88342 SURGICAL PATHOLOGY: ICD-10-PCS | Mod: 26,,, | Performed by: PATHOLOGY

## 2021-12-06 PROCEDURE — 99499 UNLISTED E&M SERVICE: CPT | Mod: ,,, | Performed by: OBSTETRICS & GYNECOLOGY

## 2021-12-06 PROCEDURE — 88307 TISSUE EXAM BY PATHOLOGIST: CPT | Mod: 26,,, | Performed by: PATHOLOGY

## 2021-12-06 PROCEDURE — 99499 NO LOS: ICD-10-PCS | Mod: ,,, | Performed by: OBSTETRICS & GYNECOLOGY

## 2021-12-06 PROCEDURE — 88342 IMHCHEM/IMCYTCHM 1ST ANTB: CPT | Mod: SUR | Performed by: OBSTETRICS & GYNECOLOGY

## 2021-12-06 PROCEDURE — 57522 CONIZATION OF CERVIX: CPT | Mod: ,,, | Performed by: OBSTETRICS & GYNECOLOGY

## 2021-12-06 PROCEDURE — 96372 PR INJECTION,THERAP/PROPH/DIAG2ST, IM OR SUBCUT: ICD-10-PCS | Mod: 59,,, | Performed by: OBSTETRICS & GYNECOLOGY

## 2021-12-06 PROCEDURE — 88342 IMHCHEM/IMCYTCHM 1ST ANTB: CPT | Mod: 26,,, | Performed by: PATHOLOGY

## 2021-12-06 PROCEDURE — 57522 PR CONIZATION CERVIX,LOOP ELECTRD: ICD-10-PCS | Mod: ,,, | Performed by: OBSTETRICS & GYNECOLOGY

## 2021-12-06 PROCEDURE — 88307 TISSUE EXAM BY PATHOLOGIST: CPT | Mod: SUR | Performed by: OBSTETRICS & GYNECOLOGY

## 2021-12-06 RX ORDER — KETOROLAC TROMETHAMINE 30 MG/ML
60 INJECTION, SOLUTION INTRAMUSCULAR; INTRAVENOUS
Status: COMPLETED | OUTPATIENT
Start: 2021-12-06 | End: 2021-12-06

## 2021-12-06 RX ADMIN — KETOROLAC TROMETHAMINE 60 MG: 30 INJECTION, SOLUTION INTRAMUSCULAR; INTRAVENOUS at 11:12

## 2022-01-06 ENCOUNTER — OFFICE VISIT (OUTPATIENT)
Dept: OBSTETRICS AND GYNECOLOGY | Facility: CLINIC | Age: 22
End: 2022-01-06
Payer: MEDICAID

## 2022-01-06 VITALS
WEIGHT: 212 LBS | DIASTOLIC BLOOD PRESSURE: 73 MMHG | SYSTOLIC BLOOD PRESSURE: 114 MMHG | TEMPERATURE: 98 F | BODY MASS INDEX: 31.4 KG/M2 | RESPIRATION RATE: 17 BRPM | HEART RATE: 64 BPM | HEIGHT: 69 IN

## 2022-01-06 DIAGNOSIS — N87.0 DYSPLASIA OF CERVIX, LOW GRADE (CIN 1): ICD-10-CM

## 2022-01-06 DIAGNOSIS — N87.1 DYSPLASIA OF CERVIX, HIGH GRADE CIN 2: Primary | ICD-10-CM

## 2022-01-06 PROCEDURE — 3074F SYST BP LT 130 MM HG: CPT | Mod: CPTII,,, | Performed by: OBSTETRICS & GYNECOLOGY

## 2022-01-06 PROCEDURE — 3078F DIAST BP <80 MM HG: CPT | Mod: CPTII,,, | Performed by: OBSTETRICS & GYNECOLOGY

## 2022-01-06 PROCEDURE — 3008F PR BODY MASS INDEX (BMI) DOCUMENTED: ICD-10-PCS | Mod: CPTII,,, | Performed by: OBSTETRICS & GYNECOLOGY

## 2022-01-06 PROCEDURE — 1159F MED LIST DOCD IN RCRD: CPT | Mod: CPTII,,, | Performed by: OBSTETRICS & GYNECOLOGY

## 2022-01-06 PROCEDURE — 1159F PR MEDICATION LIST DOCUMENTED IN MEDICAL RECORD: ICD-10-PCS | Mod: CPTII,,, | Performed by: OBSTETRICS & GYNECOLOGY

## 2022-01-06 PROCEDURE — 99214 OFFICE O/P EST MOD 30 MIN: CPT | Mod: ,,, | Performed by: OBSTETRICS & GYNECOLOGY

## 2022-01-06 PROCEDURE — 3008F BODY MASS INDEX DOCD: CPT | Mod: CPTII,,, | Performed by: OBSTETRICS & GYNECOLOGY

## 2022-01-06 PROCEDURE — 3074F PR MOST RECENT SYSTOLIC BLOOD PRESSURE < 130 MM HG: ICD-10-PCS | Mod: CPTII,,, | Performed by: OBSTETRICS & GYNECOLOGY

## 2022-01-06 PROCEDURE — 3078F PR MOST RECENT DIASTOLIC BLOOD PRESSURE < 80 MM HG: ICD-10-PCS | Mod: CPTII,,, | Performed by: OBSTETRICS & GYNECOLOGY

## 2022-01-06 PROCEDURE — 99214 PR OFFICE/OUTPT VISIT, EST, LEVL IV, 30-39 MIN: ICD-10-PCS | Mod: ,,, | Performed by: OBSTETRICS & GYNECOLOGY

## 2022-01-06 NOTE — PROGRESS NOTES
History & Physical    SUBJECTIVE:     History of Present Illness:  Patient is a 22 y.o. female presents with HGSIL of PAP and COLPO; Pt here to review results from [facility] on 12/06/2021.    Chief Complaint   Patient presents with    Follow-up     Follow up from [facility] on 12/06/2021       Review of patient's allergies indicates:   Allergen Reactions    Strawberries [strawberry]        Current Outpatient Medications   Medication Sig Dispense Refill    polyethylene glycol (GLYCOLAX) 17 gram/dose powder Take 17 g by mouth once daily. (Patient not taking: No sig reported) 119 g 0     No current facility-administered medications for this visit.       Past Medical History:   Diagnosis Date    Allergy     Asthma      No past surgical history on file.  Family History   Problem Relation Age of Onset    Diabetes Father     Diabetes Maternal Grandmother     No Known Problems Mother     No Known Problems Sister     No Known Problems Brother     Diabetes Maternal Aunt     No Known Problems Maternal Uncle     No Known Problems Paternal Aunt     Stroke Maternal Grandfather     No Known Problems Paternal Grandmother     No Known Problems Paternal Grandfather      Social History     Tobacco Use    Smoking status: Never Smoker    Smokeless tobacco: Never Used   Substance Use Topics    Alcohol use: Not Currently    Drug use: Never        Review of Systems:  Review of Systems   Constitutional: Negative for appetite change, chills, fatigue and fever.   HENT: Negative.    Eyes: Negative.    Respiratory: Negative for cough, chest tightness and shortness of breath.    Cardiovascular: Negative for chest pain, palpitations and leg swelling.   Gastrointestinal: Negative for abdominal distention, abdominal pain, blood in stool, constipation, diarrhea, nausea and vomiting.   Endocrine: Negative for cold intolerance, heat intolerance, polydipsia, polyphagia and polyuria.   Genitourinary: Negative for difficulty urinating,  "dyspareunia, dysuria, flank pain, frequency, pelvic pain, urgency, vaginal bleeding, vaginal discharge and vaginal pain.   Musculoskeletal: Negative.    Skin: Negative.    Neurological: Negative.    Psychiatric/Behavioral: Negative.  Negative for agitation, behavioral problems, confusion and sleep disturbance. The patient is not nervous/anxious.        OBJECTIVE:     Vital Signs (Most Recent)  Temp: 97.8 °F (36.6 °C) (01/06/22 1436)  Pulse: 64 (01/06/22 1436)  Resp: 17 (01/06/22 1436)  BP: 114/73 (01/06/22 1436)  5' 9" (1.753 m)  96.2 kg (212 lb)     Physical Exam:  Physical Exam  Vitals reviewed. Exam conducted with a chaperone present.   Constitutional:       Appearance: Normal appearance.   HENT:      Head: Normocephalic and atraumatic.      Mouth/Throat:      Mouth: Mucous membranes are moist.   Eyes:      Extraocular Movements: Extraocular movements intact.      Pupils: Pupils are equal, round, and reactive to light.   Cardiovascular:      Rate and Rhythm: Normal rate and regular rhythm.      Pulses: Normal pulses.      Heart sounds: Normal heart sounds.   Pulmonary:      Effort: Pulmonary effort is normal.      Breath sounds: Normal breath sounds.   Abdominal:      General: Abdomen is flat. Bowel sounds are normal.      Palpations: Abdomen is soft.   Musculoskeletal:         General: Normal range of motion.      Cervical back: Normal range of motion.   Skin:     General: Skin is warm and dry.   Neurological:      General: No focal deficit present.      Mental Status: She is alert and oriented to person, place, and time.   Psychiatric:         Mood and Affect: Mood normal.         Behavior: Behavior normal.         Thought Content: Thought content normal.         Judgment: Judgment normal.         Laboratory  Pathology: Reviewed           ASSESSMENT/PLAN:   Connie was seen today for follow-up.    Diagnoses and all orders for this visit:    Dysplasia of cervix, high grade GENA 2    Dysplasia of cervix, low " grade (GENA 1)        PLAN:Plan    Biopsy-colposcopy results reviewed with the pt.  Pathology results discussed in detail with recommendation for repeat pap in 4 months.  Pt is to [facility] in 4 months for RePAP

## 2022-05-09 ENCOUNTER — OFFICE VISIT (OUTPATIENT)
Dept: OBSTETRICS AND GYNECOLOGY | Facility: CLINIC | Age: 22
End: 2022-05-09
Payer: MEDICAID

## 2022-05-09 VITALS
BODY MASS INDEX: 31.25 KG/M2 | HEART RATE: 71 BPM | DIASTOLIC BLOOD PRESSURE: 68 MMHG | HEIGHT: 69 IN | RESPIRATION RATE: 18 BRPM | WEIGHT: 211 LBS | SYSTOLIC BLOOD PRESSURE: 109 MMHG

## 2022-05-09 DIAGNOSIS — N87.0 DYSPLASIA OF CERVIX, LOW GRADE (CIN 1): ICD-10-CM

## 2022-05-09 DIAGNOSIS — Z01.419 ROUTINE GYNECOLOGICAL EXAMINATION: ICD-10-CM

## 2022-05-09 DIAGNOSIS — Z12.4 CERVICAL CANCER SCREENING: Primary | ICD-10-CM

## 2022-05-09 DIAGNOSIS — N87.1 DYSPLASIA OF CERVIX, HIGH GRADE CIN 2: ICD-10-CM

## 2022-05-09 PROCEDURE — 3078F PR MOST RECENT DIASTOLIC BLOOD PRESSURE < 80 MM HG: ICD-10-PCS | Mod: CPTII,,, | Performed by: OBSTETRICS & GYNECOLOGY

## 2022-05-09 PROCEDURE — 3008F BODY MASS INDEX DOCD: CPT | Mod: CPTII,,, | Performed by: OBSTETRICS & GYNECOLOGY

## 2022-05-09 PROCEDURE — 1159F MED LIST DOCD IN RCRD: CPT | Mod: CPTII,,, | Performed by: OBSTETRICS & GYNECOLOGY

## 2022-05-09 PROCEDURE — 88142 CYTOPATH C/V THIN LAYER: CPT | Mod: GCY | Performed by: OBSTETRICS & GYNECOLOGY

## 2022-05-09 PROCEDURE — 1159F PR MEDICATION LIST DOCUMENTED IN MEDICAL RECORD: ICD-10-PCS | Mod: CPTII,,, | Performed by: OBSTETRICS & GYNECOLOGY

## 2022-05-09 PROCEDURE — 99214 OFFICE O/P EST MOD 30 MIN: CPT | Mod: ,,, | Performed by: OBSTETRICS & GYNECOLOGY

## 2022-05-09 PROCEDURE — 3074F PR MOST RECENT SYSTOLIC BLOOD PRESSURE < 130 MM HG: ICD-10-PCS | Mod: CPTII,,, | Performed by: OBSTETRICS & GYNECOLOGY

## 2022-05-09 PROCEDURE — 99214 PR OFFICE/OUTPT VISIT, EST, LEVL IV, 30-39 MIN: ICD-10-PCS | Mod: ,,, | Performed by: OBSTETRICS & GYNECOLOGY

## 2022-05-09 PROCEDURE — 3078F DIAST BP <80 MM HG: CPT | Mod: CPTII,,, | Performed by: OBSTETRICS & GYNECOLOGY

## 2022-05-09 PROCEDURE — 3074F SYST BP LT 130 MM HG: CPT | Mod: CPTII,,, | Performed by: OBSTETRICS & GYNECOLOGY

## 2022-05-09 PROCEDURE — 3008F PR BODY MASS INDEX (BMI) DOCUMENTED: ICD-10-PCS | Mod: CPTII,,, | Performed by: OBSTETRICS & GYNECOLOGY

## 2022-05-09 NOTE — PROGRESS NOTES
Connie Cook is a 22 y.o. female  presents for a follow up pap smear.  Patient's last menstrual period was 2022..  Her last pap showed LGSIL with HPV other positive . Her last colposcopy showed CIN1 and GENA 2 .   She has not had gardasil.  She does not have any complaints.    Social History     Tobacco Use    Smoking status: Never Smoker    Smokeless tobacco: Never Used   Substance Use Topics    Alcohol use: Not Currently    Drug use: Never         ROS:  GENERAL: No fever, chills, fatigability or weight loss.  VULVAR: No pain, no lesions and no itching.  VAGINAL: No relaxation, no itching, no discharge, no abnormal bleeding and no lesions.  ABDOMEN: No abdominal pain. Denies nausea. Denies vomiting. No diarrhea. No constipation  BREAST: Denies pain. No lumps. No discharge.  URINARY: No incontinence, no nocturia, no frequency and no dysuria.  CARDIOVASCULAR: No chest pain. No shortness of breath. No leg cramps.  NEUROLOGICAL: No headaches. No vision changes.         Vitals:    22 1311   BP: 109/68   Pulse: 71   Resp: 18     General Appearance: alert, appears stated age and cooperative,   Gastrointestinal Exam: soft, non-tender; bowel sounds normal; no masses,  no organomegaly,   Pelvic Exam Female: External genitalia: normal general appearance  Urinary system: normal urethra, normal urethral meatus  Vaginal: normal mucosa, no vaginal discharge  Cervix: normal, no visible lesion  Adnexa: no adnexal masses, nontender  Uterus: small, nontender,   Lymphatic Inguinal Exam: no inguinal lymph adenopathy   Psychiatric Exam: Alert and oriented, appropriate affect.    Assessment:  History of abnormal pap    Specimens (From admission, onward)    None        Plan:  F/u pap smear  Gardasil not indicated  Smoking cessationwas not indicated

## 2022-05-11 LAB
GH SERPL-MCNC: NORMAL NG/ML
INSULIN SERPL-ACNC: NORMAL U[IU]/ML
LAB AP CLINICAL INFORMATION: NORMAL
LAB AP GYN INTERPRETATION: NEGATIVE
LAB AP PAP DISCLAIMER COMMENTS: NORMAL
RENIN PLAS-CCNC: NORMAL NG/ML/H

## 2022-06-29 ENCOUNTER — TELEPHONE (OUTPATIENT)
Dept: OBSTETRICS AND GYNECOLOGY | Facility: CLINIC | Age: 22
End: 2022-06-29
Payer: MEDICAID

## 2022-06-29 NOTE — TELEPHONE ENCOUNTER
----- Message from Jm Ge sent at 6/28/2022 12:37 PM CDT -----  PATIENT CALLED WANTING TO KNOW IF HER MOM COULD  HER PAPERWORK FROM HER LEEP SURGERY FOR INSURANCE PURPOSES. SHE CAN BE REACHED @ [phone]

## 2022-06-30 NOTE — TELEPHONE ENCOUNTER
Patient came to clinic, signed VERONIQUE & showed MS DL, requested information printed for patient

## 2022-09-22 ENCOUNTER — OFFICE VISIT (OUTPATIENT)
Dept: OBSTETRICS AND GYNECOLOGY | Facility: CLINIC | Age: 22
End: 2022-09-22
Payer: MEDICAID

## 2022-09-22 VITALS
SYSTOLIC BLOOD PRESSURE: 106 MMHG | HEIGHT: 69 IN | HEART RATE: 67 BPM | WEIGHT: 202.19 LBS | DIASTOLIC BLOOD PRESSURE: 60 MMHG | BODY MASS INDEX: 29.95 KG/M2

## 2022-09-22 DIAGNOSIS — Z72.51 HIGH RISK SEXUAL BEHAVIOR, UNSPECIFIED TYPE: ICD-10-CM

## 2022-09-22 DIAGNOSIS — D06.9 HIGH GRADE SQUAMOUS INTRAEPITHELIAL LESION (HGSIL), GRADE 3 CIN, ON BIOPSY OF CERVIX: ICD-10-CM

## 2022-09-22 DIAGNOSIS — Z12.4 CERVICAL CANCER SCREENING: Primary | ICD-10-CM

## 2022-09-22 DIAGNOSIS — Z11.3 SCREEN FOR STD (SEXUALLY TRANSMITTED DISEASE): ICD-10-CM

## 2022-09-22 DIAGNOSIS — Z11.4 SCREENING FOR HIV (HUMAN IMMUNODEFICIENCY VIRUS): ICD-10-CM

## 2022-09-22 DIAGNOSIS — Z01.419 ROUTINE GYNECOLOGICAL EXAMINATION: ICD-10-CM

## 2022-09-22 LAB
HAV IGM SER QL: NORMAL
HBV CORE IGM SER QL: NORMAL
HBV SURFACE AG SERPL QL IA: NORMAL
HCV AB SER QL: NORMAL
HIV 1+O+2 AB SERPL QL: NORMAL
SYPHILIS AB INTERPRETATION: NORMAL

## 2022-09-22 PROCEDURE — 1159F PR MEDICATION LIST DOCUMENTED IN MEDICAL RECORD: ICD-10-PCS | Mod: CPTII,,, | Performed by: OBSTETRICS & GYNECOLOGY

## 2022-09-22 PROCEDURE — 87491 CHLAMYDIA/GONORRHOEAE(GC), PCR: ICD-10-PCS | Mod: ,,, | Performed by: CLINICAL MEDICAL LABORATORY

## 2022-09-22 PROCEDURE — 3078F PR MOST RECENT DIASTOLIC BLOOD PRESSURE < 80 MM HG: ICD-10-PCS | Mod: CPTII,,, | Performed by: OBSTETRICS & GYNECOLOGY

## 2022-09-22 PROCEDURE — 3078F DIAST BP <80 MM HG: CPT | Mod: CPTII,,, | Performed by: OBSTETRICS & GYNECOLOGY

## 2022-09-22 PROCEDURE — 86780 TREPONEMA PALLIDUM: CPT | Mod: ,,, | Performed by: CLINICAL MEDICAL LABORATORY

## 2022-09-22 PROCEDURE — 88142 CYTOPATH C/V THIN LAYER: CPT | Mod: GCY | Performed by: OBSTETRICS & GYNECOLOGY

## 2022-09-22 PROCEDURE — 80074 ACUTE HEPATITIS PANEL: CPT | Mod: ,,, | Performed by: CLINICAL MEDICAL LABORATORY

## 2022-09-22 PROCEDURE — 36415 PR COLLECTION VENOUS BLOOD,VENIPUNCTURE: ICD-10-PCS | Mod: ,,, | Performed by: OBSTETRICS & GYNECOLOGY

## 2022-09-22 PROCEDURE — 87491 CHLMYD TRACH DNA AMP PROBE: CPT | Mod: ,,, | Performed by: CLINICAL MEDICAL LABORATORY

## 2022-09-22 PROCEDURE — 87510 GARDNER VAG DNA DIR PROBE: CPT | Mod: ,,, | Performed by: CLINICAL MEDICAL LABORATORY

## 2022-09-22 PROCEDURE — 36415 COLL VENOUS BLD VENIPUNCTURE: CPT | Mod: ,,, | Performed by: OBSTETRICS & GYNECOLOGY

## 2022-09-22 PROCEDURE — 86780 TREPONEMA PALLIDUM (SYPHILIS) ANTIBODY: ICD-10-PCS | Mod: ,,, | Performed by: CLINICAL MEDICAL LABORATORY

## 2022-09-22 PROCEDURE — 99395 PR PREVENTIVE VISIT,EST,18-39: ICD-10-PCS | Mod: ,,, | Performed by: OBSTETRICS & GYNECOLOGY

## 2022-09-22 PROCEDURE — 3074F PR MOST RECENT SYSTOLIC BLOOD PRESSURE < 130 MM HG: ICD-10-PCS | Mod: CPTII,,, | Performed by: OBSTETRICS & GYNECOLOGY

## 2022-09-22 PROCEDURE — 80074 HEPATITIS PANEL, ACUTE: ICD-10-PCS | Mod: ,,, | Performed by: CLINICAL MEDICAL LABORATORY

## 2022-09-22 PROCEDURE — 1159F MED LIST DOCD IN RCRD: CPT | Mod: CPTII,,, | Performed by: OBSTETRICS & GYNECOLOGY

## 2022-09-22 PROCEDURE — 3074F SYST BP LT 130 MM HG: CPT | Mod: CPTII,,, | Performed by: OBSTETRICS & GYNECOLOGY

## 2022-09-22 PROCEDURE — 87660 BACTERIAL VAGINOSIS: ICD-10-PCS | Mod: ,,, | Performed by: CLINICAL MEDICAL LABORATORY

## 2022-09-22 PROCEDURE — 3008F PR BODY MASS INDEX (BMI) DOCUMENTED: ICD-10-PCS | Mod: CPTII,,, | Performed by: OBSTETRICS & GYNECOLOGY

## 2022-09-22 PROCEDURE — 3008F BODY MASS INDEX DOCD: CPT | Mod: CPTII,,, | Performed by: OBSTETRICS & GYNECOLOGY

## 2022-09-22 PROCEDURE — 99395 PREV VISIT EST AGE 18-39: CPT | Mod: ,,, | Performed by: OBSTETRICS & GYNECOLOGY

## 2022-09-22 PROCEDURE — 87510 BACTERIAL VAGINOSIS: ICD-10-PCS | Mod: ,,, | Performed by: CLINICAL MEDICAL LABORATORY

## 2022-09-22 PROCEDURE — 87480 CANDIDA DNA DIR PROBE: CPT | Mod: ,,, | Performed by: CLINICAL MEDICAL LABORATORY

## 2022-09-22 PROCEDURE — 87591 CHLAMYDIA/GONORRHOEAE(GC), PCR: ICD-10-PCS | Mod: ,,, | Performed by: CLINICAL MEDICAL LABORATORY

## 2022-09-22 PROCEDURE — 87480 BACTERIAL VAGINOSIS: ICD-10-PCS | Mod: ,,, | Performed by: CLINICAL MEDICAL LABORATORY

## 2022-09-22 PROCEDURE — 87591 N.GONORRHOEAE DNA AMP PROB: CPT | Mod: ,,, | Performed by: CLINICAL MEDICAL LABORATORY

## 2022-09-22 PROCEDURE — 87660 TRICHOMONAS VAGIN DIR PROBE: CPT | Mod: ,,, | Performed by: CLINICAL MEDICAL LABORATORY

## 2022-09-22 PROCEDURE — 87389 HIV 1 / 2 ANTIBODY: ICD-10-PCS | Mod: ,,, | Performed by: CLINICAL MEDICAL LABORATORY

## 2022-09-22 PROCEDURE — 87389 HIV-1 AG W/HIV-1&-2 AB AG IA: CPT | Mod: ,,, | Performed by: CLINICAL MEDICAL LABORATORY

## 2022-09-22 NOTE — PROGRESS NOTES
"CC: Well woman exam    Connie Cook is a 22 y.o. female  presents for well woman exam.    LMP: Patient's last menstrual period was 2022 (approximate)..    Last mammogram: N/A  Last Colonoscopy: N/A  LEEP- GENA 2-3  1 st repeat pap -  WNL  Pt requests STD testing and denies refill on birth control at this time.    Denies any issues, problems, or complaints.     Past Medical History:   Diagnosis Date    Allergy     Asthma      History reviewed. No pertinent surgical history.  Social History     Socioeconomic History    Marital status: Single    Number of children: 0   Occupational History    Occupation: [employer]     Comment: [employer]   Tobacco Use    Smoking status: Never    Smokeless tobacco: Never   Substance and Sexual Activity    Alcohol use: Not Currently    Drug use: Never    Sexual activity: Yes     Partners: Female     Birth control/protection: Condom   Social History Narrative    Lives at home with mother      Family History   Problem Relation Age of Onset    Diabetes Father     Diabetes Maternal Grandmother     No Known Problems Mother     No Known Problems Sister     No Known Problems Brother     Diabetes Maternal Aunt     No Known Problems Maternal Uncle     No Known Problems Paternal Aunt     Stroke Maternal Grandfather     No Known Problems Paternal Grandmother     No Known Problems Paternal Grandfather      OB History          1    Para   0    Term                AB        Living   0         SAB        IAB        Ectopic        Multiple        Live Births               Obstetric Comments   1 miscarriage-Dec 2020  8 mo gestation/ vaginally delivered, early rupture of membranes               /60   Pulse 67   Ht 5' 9" (1.753 m)   Wt 91.7 kg (202 lb 3.2 oz)   LMP 2022 (Approximate)   BMI 29.86 kg/m²       ROS:  GENERAL: Denies weight gain or weight loss. Feeling well overall.   SKIN: Denies rash or lesions.   HEAD: Denies head injury or headache.   NODES: Denies " enlarged lymph nodes.   CHEST: Denies chest pain or shortness of breath.   CARDIOVASCULAR: Denies palpitations or left sided chest pain.   ABDOMEN: No abdominal pain, constipation, diarrhea, nausea, vomiting or rectal bleeding.   URINARY: No frequency, dysuria, hematuria, or burning on urination.  REPRODUCTIVE: See HPI.   BREASTS: The patient performs breast self-examination and denies pain, lumps, or nipple discharge.   HEMATOLOGIC: No easy bruisability or excessive bleeding.   MUSCULOSKELETAL: Denies joint pain or swelling.   NEUROLOGIC: Denies syncope or weakness.   PSYCHIATRIC: Denies depression, anxiety or mood swings.    PHYSICAL EXAM:  APPEARANCE: Well nourished, well developed, in no acute distress.  AFFECT: WNL, alert and oriented x 3  SKIN: No acne or hirsutism  NECK: Neck symmetric without masses or thyromegaly  NODES: No inguinal, cervical, axillary, or femoral lymph node enlargement  CHEST: Good respiratory effect  ABDOMEN: Soft.  No tenderness or masses.  No hepatosplenomegaly.  No hernias.  BREASTS: Symmetrical, no skin changes or visible lesions.  No palpable masses, nipple discharge bilaterally.  PELVIC: Normal external genitalia without lesions.  Normal hair distribution.  Adequate perineal body, normal urethral meatus.  Vagina moist and well rugated without lesions or discharge.  Cervix pink, without lesions, discharge or tenderness.  No significant cystocele or rectocele.  Bimanual exam shows uterus to be normal size, regular, mobile and nontender.  Adnexa without masses or tenderness.    EXTREMITIES: No edema.    Cervical cancer screening  -     ThinPrep Pap Test; Future; Expected date: 09/22/2022    Routine gynecological examination  -     ThinPrep Pap Test; Future; Expected date: 09/22/2022    Screen for STD (sexually transmitted disease)  -     Chlamydia/GC, PCR  -     HIV 1/2 Ag/Ab (4th Gen); Future; Expected date: 09/22/2022  -     Treponema Pallidum (Syphillis) Antibody; Future; Expected  date: 09/22/2022  -     Hepatitis Panel, Acute; Future; Expected date: 09/22/2022  -     Bacterial Vaginosis; Future; Expected date: 09/22/2022    High grade squamous intraepithelial lesion (HGSIL), grade 3 GENA, on biopsy of cervix    High risk sexual behavior, unspecified type  -     Chlamydia/GC, PCR  -     HIV 1/2 Ag/Ab (4th Gen); Future; Expected date: 09/22/2022  -     Treponema Pallidum (Syphillis) Antibody; Future; Expected date: 09/22/2022  -     Hepatitis Panel, Acute; Future; Expected date: 09/22/2022  -     Bacterial Vaginosis; Future; Expected date: 09/22/2022    Screening for HIV (human immunodeficiency virus)  -     HIV 1/2 Ag/Ab (4th Gen); Future; Expected date: 09/22/2022          Patient was counseled today on A.C.S. Pap guidelines and recommendations for yearly pelvic exams, mammograms and monthly self breast exams; to see her PCP for other health maintenance.   Exercise regimen encouraged  Healthy food choices encouraged  Questions answered to desired level of satisfaction  Verbalized understanding to all information and instructions    Follow up in about 1 year (around 9/22/2023) for Annual Exam.      Martha Barton M.D., FCOG    OB/GYN

## 2022-09-23 LAB
CANDIDA SPECIES: NEGATIVE
GARDNERELLA: POSITIVE
TRICHOMONAS: NEGATIVE

## 2022-09-24 LAB
CHLAMYDIA BY PCR: NEGATIVE
N. GONORRHOEAE (GC) BY PCR: NEGATIVE

## 2022-09-28 ENCOUNTER — TELEPHONE (OUTPATIENT)
Dept: OBSTETRICS AND GYNECOLOGY | Facility: CLINIC | Age: 22
End: 2022-09-28
Payer: MEDICAID

## 2022-09-28 NOTE — TELEPHONE ENCOUNTER
Verbal Rx per Dr. Carlton w/read back: metronidazole gel 0.75%, insert one 5 gm applicator intravaginally once daily at bedtime for 5 days, dispense 1 unit, refill 0

## 2022-09-28 NOTE — TELEPHONE ENCOUNTER
----- Message from Odalis Hollis RN sent at 9/27/2022  3:35 PM CDT -----  Per Dr. Carlton - Rx to pharmacy for Gardnerella/Bacterial Vaginosis

## 2022-09-29 LAB
CHLAMYDIA BY PCR: NEGATIVE
N. GONORRHOEAE (GC) BY PCR: NEGATIVE

## 2022-10-04 NOTE — TELEPHONE ENCOUNTER
Contacted [facility] at [phone]; Verbal Rx left on pharmacy VM

## 2023-06-20 ENCOUNTER — PATIENT MESSAGE (OUTPATIENT)
Dept: RESEARCH | Facility: HOSPITAL | Age: 23
End: 2023-06-20

## 2023-09-25 ENCOUNTER — OFFICE VISIT (OUTPATIENT)
Dept: OBSTETRICS AND GYNECOLOGY | Facility: CLINIC | Age: 23
End: 2023-09-25
Payer: COMMERCIAL

## 2023-09-25 VITALS
DIASTOLIC BLOOD PRESSURE: 82 MMHG | BODY MASS INDEX: 30.39 KG/M2 | SYSTOLIC BLOOD PRESSURE: 104 MMHG | HEART RATE: 85 BPM | HEIGHT: 69 IN | RESPIRATION RATE: 18 BRPM | WEIGHT: 205.19 LBS | OXYGEN SATURATION: 99 %

## 2023-09-25 DIAGNOSIS — Z12.4 ENCOUNTER FOR SCREENING FOR MALIGNANT NEOPLASM OF CERVIX: ICD-10-CM

## 2023-09-25 DIAGNOSIS — Z11.4 ENCOUNTER FOR HIV (HUMAN IMMUNODEFICIENCY VIRUS) TEST: ICD-10-CM

## 2023-09-25 DIAGNOSIS — Z11.3 SCREEN FOR STD (SEXUALLY TRANSMITTED DISEASE): ICD-10-CM

## 2023-09-25 DIAGNOSIS — Z72.51 UNPROTECTED SEX: ICD-10-CM

## 2023-09-25 DIAGNOSIS — Z01.419 WELL WOMAN EXAM WITH ROUTINE GYNECOLOGICAL EXAM: Primary | ICD-10-CM

## 2023-09-25 LAB
CANDIDA SPECIES: NEGATIVE
GARDNERELLA: POSITIVE
HAV IGM SER QL: NORMAL
HBV CORE IGM SER QL: NORMAL
HBV SURFACE AG SERPL QL IA: NORMAL
HCV AB SER QL: NORMAL
HIV 1+O+2 AB SERPL QL: NORMAL
SYPHILIS AB INTERPRETATION: NORMAL
TRICHOMONAS: NEGATIVE

## 2023-09-25 PROCEDURE — 87510 BACTERIAL VAGINOSIS: ICD-10-PCS | Mod: ,,, | Performed by: CLINICAL MEDICAL LABORATORY

## 2023-09-25 PROCEDURE — 87480 CANDIDA DNA DIR PROBE: CPT | Mod: ,,, | Performed by: CLINICAL MEDICAL LABORATORY

## 2023-09-25 PROCEDURE — 87510 GARDNER VAG DNA DIR PROBE: CPT | Mod: ,,, | Performed by: CLINICAL MEDICAL LABORATORY

## 2023-09-25 PROCEDURE — 3074F PR MOST RECENT SYSTOLIC BLOOD PRESSURE < 130 MM HG: ICD-10-PCS | Mod: CPTII,,, | Performed by: OBSTETRICS & GYNECOLOGY

## 2023-09-25 PROCEDURE — 87491 CHLMYD TRACH DNA AMP PROBE: CPT | Mod: ,,, | Performed by: CLINICAL MEDICAL LABORATORY

## 2023-09-25 PROCEDURE — 3074F SYST BP LT 130 MM HG: CPT | Mod: CPTII,,, | Performed by: OBSTETRICS & GYNECOLOGY

## 2023-09-25 PROCEDURE — 87389 HIV-1 AG W/HIV-1&-2 AB AG IA: CPT | Mod: ,,, | Performed by: CLINICAL MEDICAL LABORATORY

## 2023-09-25 PROCEDURE — 87660 BACTERIAL VAGINOSIS: ICD-10-PCS | Mod: ,,, | Performed by: CLINICAL MEDICAL LABORATORY

## 2023-09-25 PROCEDURE — 86780 TREPONEMA PALLIDUM: CPT | Mod: ,,, | Performed by: CLINICAL MEDICAL LABORATORY

## 2023-09-25 PROCEDURE — 80074 ACUTE HEPATITIS PANEL: CPT | Mod: ,,, | Performed by: CLINICAL MEDICAL LABORATORY

## 2023-09-25 PROCEDURE — 99395 PR PREVENTIVE VISIT,EST,18-39: ICD-10-PCS | Mod: ,,, | Performed by: OBSTETRICS & GYNECOLOGY

## 2023-09-25 PROCEDURE — 36415 PR COLLECTION VENOUS BLOOD,VENIPUNCTURE: ICD-10-PCS | Mod: ,,, | Performed by: OBSTETRICS & GYNECOLOGY

## 2023-09-25 PROCEDURE — 99395 PREV VISIT EST AGE 18-39: CPT | Mod: ,,, | Performed by: OBSTETRICS & GYNECOLOGY

## 2023-09-25 PROCEDURE — 87591 CHLAMYDIA/GONORRHOEAE(GC), PCR: ICD-10-PCS | Mod: ,,, | Performed by: CLINICAL MEDICAL LABORATORY

## 2023-09-25 PROCEDURE — 3079F DIAST BP 80-89 MM HG: CPT | Mod: CPTII,,, | Performed by: OBSTETRICS & GYNECOLOGY

## 2023-09-25 PROCEDURE — 1159F PR MEDICATION LIST DOCUMENTED IN MEDICAL RECORD: ICD-10-PCS | Mod: CPTII,,, | Performed by: OBSTETRICS & GYNECOLOGY

## 2023-09-25 PROCEDURE — 1159F MED LIST DOCD IN RCRD: CPT | Mod: CPTII,,, | Performed by: OBSTETRICS & GYNECOLOGY

## 2023-09-25 PROCEDURE — 87591 N.GONORRHOEAE DNA AMP PROB: CPT | Mod: ,,, | Performed by: CLINICAL MEDICAL LABORATORY

## 2023-09-25 PROCEDURE — 3008F PR BODY MASS INDEX (BMI) DOCUMENTED: ICD-10-PCS | Mod: CPTII,,, | Performed by: OBSTETRICS & GYNECOLOGY

## 2023-09-25 PROCEDURE — 80074 HEPATITIS PANEL, ACUTE: ICD-10-PCS | Mod: ,,, | Performed by: CLINICAL MEDICAL LABORATORY

## 2023-09-25 PROCEDURE — 88142 CYTOPATH C/V THIN LAYER: CPT | Mod: TC,GCY | Performed by: OBSTETRICS & GYNECOLOGY

## 2023-09-25 PROCEDURE — 87389 HIV 1 / 2 ANTIBODY: ICD-10-PCS | Mod: ,,, | Performed by: CLINICAL MEDICAL LABORATORY

## 2023-09-25 PROCEDURE — 87491 CHLAMYDIA/GONORRHOEAE(GC), PCR: ICD-10-PCS | Mod: ,,, | Performed by: CLINICAL MEDICAL LABORATORY

## 2023-09-25 PROCEDURE — 3008F BODY MASS INDEX DOCD: CPT | Mod: CPTII,,, | Performed by: OBSTETRICS & GYNECOLOGY

## 2023-09-25 PROCEDURE — 36415 COLL VENOUS BLD VENIPUNCTURE: CPT | Mod: ,,, | Performed by: OBSTETRICS & GYNECOLOGY

## 2023-09-25 PROCEDURE — 3079F PR MOST RECENT DIASTOLIC BLOOD PRESSURE 80-89 MM HG: ICD-10-PCS | Mod: CPTII,,, | Performed by: OBSTETRICS & GYNECOLOGY

## 2023-09-25 PROCEDURE — 86780 TREPONEMA PALLIDUM (SYPHILIS) ANTIBODY: ICD-10-PCS | Mod: ,,, | Performed by: CLINICAL MEDICAL LABORATORY

## 2023-09-25 PROCEDURE — 87480 BACTERIAL VAGINOSIS: ICD-10-PCS | Mod: ,,, | Performed by: CLINICAL MEDICAL LABORATORY

## 2023-09-25 PROCEDURE — 87660 TRICHOMONAS VAGIN DIR PROBE: CPT | Mod: ,,, | Performed by: CLINICAL MEDICAL LABORATORY

## 2023-09-25 NOTE — PROGRESS NOTES
"CC: Well woman exam    Connie Cook is a 23 y.o. female  presents for well woman exam.    LMP: Patient's last menstrual period was 2023 (exact date)..    Last mammogram: N/A  Last Colonoscopy: N/A    Denies any issues, problems, or complaints.     Pt requests STD testing.    Past Medical History:   Diagnosis Date    Allergy     Asthma      History reviewed. No pertinent surgical history.  Social History     Socioeconomic History    Marital status: Single    Number of children: 0   Occupational History    Occupation: [employer]     Comment: [employer]   Tobacco Use    Smoking status: Never    Smokeless tobacco: Never   Substance and Sexual Activity    Alcohol use: Not Currently    Drug use: Never    Sexual activity: Yes     Partners: Female     Birth control/protection: Condom   Social History Narrative    Lives at home with mother      Family History   Problem Relation Age of Onset    Diabetes Father     Diabetes Maternal Grandmother     No Known Problems Mother     No Known Problems Sister     No Known Problems Brother     Diabetes Maternal Aunt     No Known Problems Maternal Uncle     No Known Problems Paternal Aunt     Stroke Maternal Grandfather     No Known Problems Paternal Grandmother     No Known Problems Paternal Grandfather      OB History          1    Para   0    Term                AB        Living   0         SAB        IAB        Ectopic        Multiple        Live Births               Obstetric Comments   1 miscarriage-Dec 2020  8 mo gestation/ vaginally delivered, early rupture of membranes               /82 (BP Location: Left arm, Patient Position: Sitting)   Pulse 85   Resp 18   Ht 5' 9" (1.753 m)   Wt 93.1 kg (205 lb 3.2 oz)   LMP 2023 (Exact Date)   SpO2 99%   BMI 30.30 kg/m²       ROS:  GENERAL: Denies weight gain or weight loss. Feeling well overall.   SKIN: Denies rash or lesions.   HEAD: Denies head injury or headache.   NODES: Denies enlarged lymph " nodes.   CHEST: Denies chest pain or shortness of breath.   CARDIOVASCULAR: Denies palpitations or left sided chest pain.   ABDOMEN: No abdominal pain, constipation, diarrhea, nausea, vomiting or rectal bleeding.   URINARY: No frequency, dysuria, hematuria, or burning on urination.  REPRODUCTIVE: See HPI.   BREASTS: The patient performs breast self-examination and denies pain, lumps, or nipple discharge.   HEMATOLOGIC: No easy bruisability or excessive bleeding.   MUSCULOSKELETAL: Denies joint pain or swelling.   NEUROLOGIC: Denies syncope or weakness.   PSYCHIATRIC: Denies depression, anxiety or mood swings.    PHYSICAL EXAM:  APPEARANCE: Well nourished, well developed, in no acute distress.  AFFECT: WNL, alert and oriented x 3  SKIN: No acne or hirsutism  NECK: Neck symmetric without masses or thyromegaly  NODES: No inguinal, cervical, axillary, or femoral lymph node enlargement  CHEST: Good respiratory effect  ABDOMEN: Soft.  No tenderness or masses.  No hepatosplenomegaly.  No hernias.  BREASTS: Symmetrical, no skin changes or visible lesions.  No palpable masses, nipple discharge bilaterally.  PELVIC: Normal external genitalia without lesions.  Normal hair distribution.  Adequate perineal body, normal urethral meatus.  Vagina moist and well rugated without lesions or discharge.  Cervix pink, without lesions, discharge or tenderness.  No significant cystocele or rectocele.  Bimanual exam shows uterus to be normal size, regular, mobile and nontender.  Adnexa without masses or tenderness.    EXTREMITIES: No edema.    Well woman exam with routine gynecological exam  -     ThinPrep Pap Test; Future; Expected date: 09/25/2023    Encounter for screening for malignant neoplasm of cervix  -     ThinPrep Pap Test; Future; Expected date: 09/25/2023    Screen for STD (sexually transmitted disease)  -     Bacterial Vaginosis; Future; Expected date: 09/25/2023  -     Chlamydia/GC, PCR; Future; Expected date: 09/25/2023  -      Treponema Pallidum (Syphillis) Antibody; Future; Expected date: 09/25/2023  -     HIV 1/2 Ag/Ab (4th Gen); Future; Expected date: 09/25/2023  -     Hepatitis Panel, Acute; Future; Expected date: 09/25/2023    Unprotected sex  -     Bacterial Vaginosis; Future; Expected date: 09/25/2023  -     Chlamydia/GC, PCR; Future; Expected date: 09/25/2023  -     Treponema Pallidum (Syphillis) Antibody; Future; Expected date: 09/25/2023  -     HIV 1/2 Ag/Ab (4th Gen); Future; Expected date: 09/25/2023  -     Hepatitis Panel, Acute; Future; Expected date: 09/25/2023    Encounter for HIV (human immunodeficiency virus) test  -     HIV 1/2 Ag/Ab (4th Gen); Future; Expected date: 09/25/2023            Patient was counseled today on A.C.S. Pap guidelines and recommendations for yearly pelvic exams, mammograms and monthly self breast exams; to see her PCP for other health maintenance.   Exercise regimen encouraged  Healthy food choices encouraged  Questions answered to desired level of satisfaction  Verbalized understanding to all information and instructions    Follow up in about 1 year (around 9/25/2024) for Annual Exam.      Martha Barton M.D., FCOG    OB/GYN

## 2023-09-26 LAB
CHLAMYDIA BY PCR: NEGATIVE
N. GONORRHOEAE (GC) BY PCR: NEGATIVE

## 2023-09-27 DIAGNOSIS — N76.0 BV (BACTERIAL VAGINOSIS): Primary | ICD-10-CM

## 2023-09-27 DIAGNOSIS — B96.89 BV (BACTERIAL VAGINOSIS): Primary | ICD-10-CM

## 2023-09-27 RX ORDER — METRONIDAZOLE 500 MG/1
500 TABLET ORAL 2 TIMES DAILY
Qty: 14 TABLET | Refills: 0 | Status: SHIPPED | OUTPATIENT
Start: 2023-09-27 | End: 2023-10-04

## 2023-09-29 DIAGNOSIS — N76.0 BV (BACTERIAL VAGINOSIS): ICD-10-CM

## 2023-09-29 DIAGNOSIS — B96.89 BV (BACTERIAL VAGINOSIS): ICD-10-CM

## 2023-12-19 ENCOUNTER — OFFICE VISIT (OUTPATIENT)
Dept: FAMILY MEDICINE | Facility: CLINIC | Age: 23
End: 2023-12-19
Payer: COMMERCIAL

## 2023-12-19 VITALS
TEMPERATURE: 99 F | HEART RATE: 86 BPM | OXYGEN SATURATION: 99 % | HEIGHT: 69 IN | SYSTOLIC BLOOD PRESSURE: 123 MMHG | DIASTOLIC BLOOD PRESSURE: 78 MMHG | WEIGHT: 206.63 LBS | BODY MASS INDEX: 30.61 KG/M2

## 2023-12-19 DIAGNOSIS — Z11.3 SCREENING EXAMINATION FOR STD (SEXUALLY TRANSMITTED DISEASE): Primary | ICD-10-CM

## 2023-12-19 LAB
B-HCG UR QL: NEGATIVE
BILIRUB SERPL-MCNC: NEGATIVE MG/DL
BLOOD URINE, POC: NEGATIVE
CANDIDA SPECIES: NEGATIVE
COLOR, POC UA: YELLOW
CTP QC/QA: YES
GARDNERELLA: POSITIVE
GLUCOSE UR QL STRIP: NEGATIVE
KETONES UR QL STRIP: NEGATIVE
LEUKOCYTE ESTERASE URINE, POC: NEGATIVE
NITRITE, POC UA: NEGATIVE
PH, POC UA: 7
PROTEIN, POC: NEGATIVE
SPECIFIC GRAVITY, POC UA: 1.01
TRICHOMONAS: NEGATIVE
UROBILINOGEN, POC UA: 1

## 2023-12-19 PROCEDURE — 87591 CHLAMYDIA/GONORRHOEAE(GC), PCR: ICD-10-PCS | Mod: ,,, | Performed by: CLINICAL MEDICAL LABORATORY

## 2023-12-19 PROCEDURE — 99213 PR OFFICE/OUTPT VISIT, EST, LEVL III, 20-29 MIN: ICD-10-PCS | Mod: ,,,

## 2023-12-19 PROCEDURE — 87491 CHLAMYDIA/GONORRHOEAE(GC), PCR: ICD-10-PCS | Mod: ,,, | Performed by: CLINICAL MEDICAL LABORATORY

## 2023-12-19 PROCEDURE — 87591 N.GONORRHOEAE DNA AMP PROB: CPT | Mod: ,,, | Performed by: CLINICAL MEDICAL LABORATORY

## 2023-12-19 PROCEDURE — 3074F SYST BP LT 130 MM HG: CPT | Mod: ,,,

## 2023-12-19 PROCEDURE — 3074F PR MOST RECENT SYSTOLIC BLOOD PRESSURE < 130 MM HG: ICD-10-PCS | Mod: ,,,

## 2023-12-19 PROCEDURE — 87480 BACTERIAL VAGINOSIS: ICD-10-PCS | Mod: ,,, | Performed by: CLINICAL MEDICAL LABORATORY

## 2023-12-19 PROCEDURE — 81003 POCT URINALYSIS W/O SCOPE: ICD-10-PCS | Mod: QW,,,

## 2023-12-19 PROCEDURE — 87491 CHLMYD TRACH DNA AMP PROBE: CPT | Mod: ,,, | Performed by: CLINICAL MEDICAL LABORATORY

## 2023-12-19 PROCEDURE — 3078F PR MOST RECENT DIASTOLIC BLOOD PRESSURE < 80 MM HG: ICD-10-PCS | Mod: ,,,

## 2023-12-19 PROCEDURE — 87510 BACTERIAL VAGINOSIS: ICD-10-PCS | Mod: ,,, | Performed by: CLINICAL MEDICAL LABORATORY

## 2023-12-19 PROCEDURE — 87660 BACTERIAL VAGINOSIS: ICD-10-PCS | Mod: ,,, | Performed by: CLINICAL MEDICAL LABORATORY

## 2023-12-19 PROCEDURE — 87510 GARDNER VAG DNA DIR PROBE: CPT | Mod: ,,, | Performed by: CLINICAL MEDICAL LABORATORY

## 2023-12-19 PROCEDURE — 99213 OFFICE O/P EST LOW 20 MIN: CPT | Mod: ,,,

## 2023-12-19 PROCEDURE — 87480 CANDIDA DNA DIR PROBE: CPT | Mod: ,,, | Performed by: CLINICAL MEDICAL LABORATORY

## 2023-12-19 PROCEDURE — 81025 POCT URINE PREGNANCY: ICD-10-PCS | Mod: QW,,,

## 2023-12-19 PROCEDURE — 81025 URINE PREGNANCY TEST: CPT | Mod: QW,,,

## 2023-12-19 PROCEDURE — 81003 URINALYSIS AUTO W/O SCOPE: CPT | Mod: QW,,,

## 2023-12-19 PROCEDURE — 1159F PR MEDICATION LIST DOCUMENTED IN MEDICAL RECORD: ICD-10-PCS | Mod: ,,,

## 2023-12-19 PROCEDURE — 3008F PR BODY MASS INDEX (BMI) DOCUMENTED: ICD-10-PCS | Mod: ,,,

## 2023-12-19 PROCEDURE — 3008F BODY MASS INDEX DOCD: CPT | Mod: ,,,

## 2023-12-19 PROCEDURE — 1159F MED LIST DOCD IN RCRD: CPT | Mod: ,,,

## 2023-12-19 PROCEDURE — 87660 TRICHOMONAS VAGIN DIR PROBE: CPT | Mod: ,,, | Performed by: CLINICAL MEDICAL LABORATORY

## 2023-12-19 PROCEDURE — 3078F DIAST BP <80 MM HG: CPT | Mod: ,,,

## 2023-12-20 LAB
CHLAMYDIA BY PCR: NEGATIVE
N. GONORRHOEAE (GC) BY PCR: NEGATIVE

## 2023-12-20 RX ORDER — METRONIDAZOLE 7.5 MG/G
1 GEL VAGINAL NIGHTLY
Qty: 70 G | Refills: 0 | Status: SHIPPED | OUTPATIENT
Start: 2023-12-20 | End: 2023-12-25

## 2023-12-21 PROBLEM — Z11.3 SCREENING EXAMINATION FOR STD (SEXUALLY TRANSMITTED DISEASE): Status: ACTIVE | Noted: 2023-12-21

## 2023-12-21 NOTE — PROGRESS NOTES
PATIENT NAME: Connie Cook  : 2000  DATE: 23  MRN: [id number]      Reason for Visit / Chief Complaint: Exposure to STD (Exam A)       Update PCP  Update Chief Complaint         History of Present Illness / Problem Focused Workflow     Connie Cook presents to the clinic with Exposure to STD (Exam A)     Presents to clinic for STD check. She has vaginal discharge with some odor/itching. Denies fever/chills, n/v.       Review of Systems     @Review of Systems   Constitutional:  Negative for chills and fever.   Gastrointestinal:  Negative for abdominal pain, diarrhea, nausea and vomiting.   Genitourinary:  Positive for vaginal discharge. Negative for decreased urine volume, dysuria, flank pain, genital sores, pelvic pain, urgency, vaginal bleeding and vaginal pain.   Musculoskeletal:  Negative for back pain.     Medical / Social / Family History     Past Medical History:   Diagnosis Date    Allergy     Asthma     Screening examination for STD (sexually transmitted disease) 2023       History reviewed. No pertinent surgical history.    Social History  Ms.  reports that she has never smoked. She has never used smokeless tobacco. She reports that she does not currently use alcohol. She reports that she does not use drugs.    Family History  Ms.'s family history includes Diabetes in her father, maternal aunt, and maternal grandmother; No Known Problems in her brother, maternal uncle, mother, paternal aunt, paternal grandfather, paternal grandmother, and sister; Stroke in her maternal grandfather.    Medications and Allergies     Medications  No outpatient medications have been marked as taking for the 23 encounter (Office Visit) with Lavern Rapp FNP-C.       Allergies  Review of patient's allergies indicates:   Allergen Reactions    Strawberries [strawberry] Swelling     itching       Physical Examination     Vitals:    23 1506   BP: 123/78   Pulse: 86   Temp: 98.9 °F  (37.2 °C)     Physical Exam  Constitutional:       Appearance: Normal appearance. She is obese.   HENT:      Head: Normocephalic and atraumatic.      Nose: Nose normal.      Mouth/Throat:      Mouth: Mucous membranes are moist.      Pharynx: Oropharynx is clear.   Cardiovascular:      Rate and Rhythm: Normal rate and regular rhythm.      Pulses: Normal pulses.      Heart sounds: Normal heart sounds.   Pulmonary:      Effort: Pulmonary effort is normal.      Breath sounds: Normal breath sounds.   Abdominal:      General: Abdomen is flat. Bowel sounds are normal.      Palpations: Abdomen is soft.   Musculoskeletal:         General: Normal range of motion.      Cervical back: Normal range of motion and neck supple.   Skin:     General: Skin is warm and dry.      Capillary Refill: Capillary refill takes less than 2 seconds.   Neurological:      General: No focal deficit present.      Mental Status: She is alert and oriented to person, place, and time.   Psychiatric:         Mood and Affect: Mood normal.         Behavior: Behavior normal.         Thought Content: Thought content normal.         Judgment: Judgment normal.                         Procedures   Assessment and Plan (including Health Maintenance)      Problem List  Smart Sets  Document Outside HM   :    Plan:   Problem List Items Addressed This Visit          ID    Screening examination for STD (sexually transmitted disease) - Primary    Relevant Orders    Chlamydia/GC, PCR (Completed)    Bacterial Vaginosis (Completed)    POCT URINALYSIS W/O SCOPE (Completed)    POCT urine pregnancy (Completed)    Syphilis Antibody with reflex to RPR    HIV 1/2 Ag/Ab (4th Gen)    Hepatitis Panel, Acute       Health Maintenance Topics with due status: Not Due       Topic Last Completion Date    Pap Smear 09/25/2023    Chlamydia Screening 12/19/2023       Future Appointments   Date Time Provider Department Center   10/7/2024 10:30 AM Martha Barton MD [facility]         Follow up if symptoms worsen or fail to improve.     Signature:  MARCO ANTONIO RODRIGUEZ-BALDEMAR        Date of encounter: 12/19/23

## 2023-12-27 ENCOUNTER — HOSPITAL ENCOUNTER (EMERGENCY)
Facility: HOSPITAL | Age: 23
Discharge: HOME | End: 2023-12-27
Attending: EMERGENCY MEDICINE
Payer: COMMERCIAL

## 2023-12-27 VITALS
BODY MASS INDEX: 30.96 KG/M2 | WEIGHT: 209 LBS | DIASTOLIC BLOOD PRESSURE: 66 MMHG | TEMPERATURE: 99 F | HEIGHT: 69 IN | HEART RATE: 80 BPM | SYSTOLIC BLOOD PRESSURE: 114 MMHG | RESPIRATION RATE: 15 BRPM | OXYGEN SATURATION: 100 %

## 2023-12-27 DIAGNOSIS — R07.9 CHEST PAIN: Primary | ICD-10-CM

## 2023-12-27 LAB
ALBUMIN SERPL BCP-MCNC: 3.2 G/DL (ref 3.5–5)
ALBUMIN/GLOB SERPL: 0.8 {RATIO}
ALP SERPL-CCNC: 62 U/L (ref 37–98)
ALT SERPL W P-5'-P-CCNC: 18 U/L (ref 13–56)
ANION GAP SERPL CALCULATED.3IONS-SCNC: 8 MMOL/L (ref 7–16)
AST SERPL W P-5'-P-CCNC: 15 U/L (ref 15–37)
BASOPHILS # BLD AUTO: 0.03 K/UL (ref 0–0.2)
BASOPHILS NFR BLD AUTO: 0.7 % (ref 0–1)
BILIRUB SERPL-MCNC: 0.2 MG/DL (ref ?–1.2)
BUN SERPL-MCNC: 8 MG/DL (ref 7–18)
BUN/CREAT SERPL: 10 (ref 6–20)
CALCIUM SERPL-MCNC: 8.3 MG/DL (ref 8.5–10.1)
CHLORIDE SERPL-SCNC: 108 MMOL/L (ref 98–107)
CO2 SERPL-SCNC: 28 MMOL/L (ref 21–32)
CREAT SERPL-MCNC: 0.77 MG/DL (ref 0.55–1.02)
DIFFERENTIAL METHOD BLD: ABNORMAL
EGFR (NO RACE VARIABLE) (RUSH/TITUS): 111 ML/MIN/1.73M2
EOSINOPHIL # BLD AUTO: 0.02 K/UL (ref 0–0.5)
EOSINOPHIL NFR BLD AUTO: 0.5 % (ref 1–4)
ERYTHROCYTE [DISTWIDTH] IN BLOOD BY AUTOMATED COUNT: 13.4 % (ref 11.5–14.5)
FLUAV AG UPPER RESP QL IA.RAPID: NEGATIVE
FLUBV AG UPPER RESP QL IA.RAPID: NEGATIVE
GLOBULIN SER-MCNC: 4.2 G/DL (ref 2–4)
GLUCOSE SERPL-MCNC: 98 MG/DL (ref 74–106)
HCT VFR BLD AUTO: 33.2 % (ref 38–47)
HGB BLD-MCNC: 11.4 G/DL (ref 12–16)
IMM GRANULOCYTES # BLD AUTO: 0.01 K/UL (ref 0–0.04)
IMM GRANULOCYTES NFR BLD: 0.2 % (ref 0–0.4)
LYMPHOCYTES # BLD AUTO: 1.48 K/UL (ref 1–4.8)
LYMPHOCYTES NFR BLD AUTO: 33.7 % (ref 27–41)
MCH RBC QN AUTO: 30.6 PG (ref 27–31)
MCHC RBC AUTO-ENTMCNC: 34.3 G/DL (ref 32–36)
MCV RBC AUTO: 89 FL (ref 80–96)
MONOCYTES # BLD AUTO: 0.46 K/UL (ref 0–0.8)
MONOCYTES NFR BLD AUTO: 10.5 % (ref 2–6)
MPC BLD CALC-MCNC: 11.4 FL (ref 9.4–12.4)
NEUTROPHILS # BLD AUTO: 2.39 K/UL (ref 1.8–7.7)
NEUTROPHILS NFR BLD AUTO: 54.4 % (ref 53–65)
NRBC # BLD AUTO: 0 X10E3/UL
NRBC, AUTO (.00): 0 %
PLATELET # BLD AUTO: 253 K/UL (ref 150–400)
POTASSIUM SERPL-SCNC: 3.9 MMOL/L (ref 3.5–5.1)
PROT SERPL-MCNC: 7.4 G/DL (ref 6.4–8.2)
RBC # BLD AUTO: 3.73 M/UL (ref 4.2–5.4)
SARS-COV-2 RDRP RESP QL NAA+PROBE: NEGATIVE
SODIUM SERPL-SCNC: 140 MMOL/L (ref 136–145)
TROPONIN I SERPL DL<=0.01 NG/ML-MCNC: 5 PG/ML
WBC # BLD AUTO: 4.39 K/UL (ref 4.5–11)

## 2023-12-27 PROCEDURE — 87635 SARS-COV-2 COVID-19 AMP PRB: CPT | Performed by: EMERGENCY MEDICINE

## 2023-12-27 PROCEDURE — 87804 INFLUENZA ASSAY W/OPTIC: CPT | Performed by: EMERGENCY MEDICINE

## 2023-12-27 PROCEDURE — 93005 ELECTROCARDIOGRAM TRACING: CPT

## 2023-12-27 PROCEDURE — 84484 ASSAY OF TROPONIN QUANT: CPT | Performed by: EMERGENCY MEDICINE

## 2023-12-27 PROCEDURE — 99284 EMERGENCY DEPT VISIT MOD MDM: CPT

## 2023-12-27 PROCEDURE — 93010 ELECTROCARDIOGRAM REPORT: CPT | Mod: ,,, | Performed by: HOSPITALIST

## 2023-12-27 PROCEDURE — 99284 PR EMERGENCY DEPT VISIT,LEVEL IV: ICD-10-PCS | Mod: ,,, | Performed by: EMERGENCY MEDICINE

## 2023-12-27 PROCEDURE — 80053 COMPREHEN METABOLIC PANEL: CPT | Performed by: EMERGENCY MEDICINE

## 2023-12-27 PROCEDURE — 93010 EKG 12-LEAD: ICD-10-PCS | Mod: ,,, | Performed by: HOSPITALIST

## 2023-12-27 PROCEDURE — 99284 EMERGENCY DEPT VISIT MOD MDM: CPT | Mod: ,,, | Performed by: EMERGENCY MEDICINE

## 2023-12-27 PROCEDURE — 85025 COMPLETE CBC W/AUTO DIFF WBC: CPT | Performed by: EMERGENCY MEDICINE

## 2023-12-27 NOTE — ED PROVIDER NOTES
Encounter Date: 12/27/2023       History     Chief Complaint   Patient presents with    Chest Pain    Shortness of Breath    Vomiting    Dizziness     States has been having chest pain off and on with dizziness and n/v x 2 days - states will get short of breath when she has the chest pain      22 Y/O FEMALE WITH CHEST PAIN THAT STARTED YESTERDAY.  DESCRIPTION IS VAGUE, AND DISCOMFORT IS INTERMITTENT.  PATIENT NOTES NO PARTICULAR REMITTING OR EXACERBATING FACTORS.  DISCOMFORT IS MILD TO MODERATE WHEN IT OCCURS.        Review of patient's allergies indicates:   Allergen Reactions    Strawberries [strawberry] Swelling     itching     Past Medical History:   Diagnosis Date    Allergy     Asthma     Screening examination for STD (sexually transmitted disease) 12/21/2023     Past Surgical History:   Procedure Laterality Date    COLPOSCOPY, WITH BIOPSY OF CERVIX  2021     Family History   Problem Relation Age of Onset    Diabetes Father     Diabetes Maternal Grandmother     No Known Problems Mother     No Known Problems Sister     No Known Problems Brother     Diabetes Maternal Aunt     No Known Problems Maternal Uncle     No Known Problems Paternal Aunt     Stroke Maternal Grandfather     No Known Problems Paternal Grandmother     No Known Problems Paternal Grandfather      Social History     Tobacco Use    Smoking status: Never    Smokeless tobacco: Never   Substance Use Topics    Alcohol use: Not Currently     Comment: occassional    Drug use: Yes     Types: Marijuana     Comment: occassional     Review of Systems   All other systems reviewed and are negative.      Physical Exam     Initial Vitals [12/27/23 0438]   BP Pulse Resp Temp SpO2   115/85 88 20 98.6 °F (37 °C) 100 %      MAP       --         Physical Exam    Nursing note and vitals reviewed.  Constitutional: She appears well-developed and well-nourished.   HENT:   Head: Normocephalic and atraumatic.   Nose: Nose normal.   Mouth/Throat: Oropharynx is clear and  moist.   Eyes: Conjunctivae and EOM are normal. Pupils are equal, round, and reactive to light.   Neck: Neck supple.   Normal range of motion.  Cardiovascular:  Normal rate, regular rhythm and normal heart sounds.           Pulmonary/Chest: Breath sounds normal.   Abdominal: Abdomen is soft. Bowel sounds are normal.   Musculoskeletal:         General: Normal range of motion.      Cervical back: Normal range of motion and neck supple.     Neurological: She is alert and oriented to person, place, and time. She has normal strength. GCS score is 15. GCS eye subscore is 4. GCS verbal subscore is 5. GCS motor subscore is 6.   Skin: Skin is warm and dry. Capillary refill takes less than 2 seconds.   Psychiatric: She has a normal mood and affect.         Medical Screening Exam   See Full Note    ED Course   Procedures  Labs Reviewed   COMPREHENSIVE METABOLIC PANEL - Abnormal; Notable for the following components:       Result Value    Chloride 108 (*)     Calcium 8.3 (*)     Albumin 3.2 (*)     Globulin 4.2 (*)     All other components within normal limits   CBC WITH DIFFERENTIAL - Abnormal; Notable for the following components:    WBC 4.39 (*)     RBC 3.73 (*)     Hemoglobin 11.4 (*)     Hematocrit 33.2 (*)     Monocytes % 10.5 (*)     Eosinophils % 0.5 (*)     All other components within normal limits   RAPID INFLUENZA A/B - Normal   SARS-COV-2 RNA AMPLIFICATION, QUAL - Normal    Narrative:     Negative SARS-CoV results should not be used as the sole basis for treatment or patient management decisions; negative results should be considered in the context of a patient's recent exposures, history and the presene of clinical signs and symptoms consistent with COVID-19.  Negative results should be treated as presumptive and confirmed by molecular assay, if necessary for patient management.   TROPONIN I - Normal   CBC W/ AUTO DIFFERENTIAL    Narrative:     The following orders were created for panel order CBC auto  differential.  Procedure                               Abnormality         Status                     ---------                               -----------         ------                     CBC with Differential[[id number]]       Abnormal            Final result                 Please view results for these tests on the individual orders.          Imaging Results    None          Medications - No data to display  Medical Decision Making  Amount and/or Complexity of Data Reviewed  Labs: ordered.                                      Clinical Impression:   Final diagnoses:  [R07.9] Chest pain (Primary)        ED Disposition Condition    Discharge Stable          ED Prescriptions    None       Follow-up Information       Follow up With Specialties Details Why Contact Info    Lavern Rapp FNP-C Family Medicine  As needed [facility] [address] [place] [address] [place] [address]  [phone]               Richie Begum MD  12/27/23 2951

## 2023-12-28 ENCOUNTER — TELEPHONE (OUTPATIENT)
Dept: EMERGENCY MEDICINE | Facility: HOSPITAL | Age: 23
End: 2023-12-28
Payer: COMMERCIAL

## 2024-03-05 ENCOUNTER — OFFICE VISIT (OUTPATIENT)
Dept: OBSTETRICS AND GYNECOLOGY | Facility: CLINIC | Age: 24
End: 2024-03-05
Payer: MEDICAID

## 2024-03-05 ENCOUNTER — PROCEDURE VISIT (OUTPATIENT)
Dept: OBSTETRICS AND GYNECOLOGY | Facility: CLINIC | Age: 24
End: 2024-03-05
Payer: MEDICAID

## 2024-03-05 VITALS
SYSTOLIC BLOOD PRESSURE: 130 MMHG | WEIGHT: 211.81 LBS | HEART RATE: 97 BPM | DIASTOLIC BLOOD PRESSURE: 76 MMHG | BODY MASS INDEX: 31.28 KG/M2

## 2024-03-05 DIAGNOSIS — O21.9 NAUSEA AND VOMITING DURING PREGNANCY: ICD-10-CM

## 2024-03-05 DIAGNOSIS — Z32.01 POSITIVE PREGNANCY TEST: ICD-10-CM

## 2024-03-05 DIAGNOSIS — N91.1 AMENORRHEA, SECONDARY: Primary | ICD-10-CM

## 2024-03-05 LAB
B-HCG UR QL: POSITIVE
CTP QC/QA: YES

## 2024-03-05 PROCEDURE — 36415 COLL VENOUS BLD VENIPUNCTURE: CPT | Mod: ,,, | Performed by: OBSTETRICS & GYNECOLOGY

## 2024-03-05 PROCEDURE — 76801 OB US < 14 WKS SINGLE FETUS: CPT | Mod: ,,, | Performed by: OBSTETRICS & GYNECOLOGY

## 2024-03-05 PROCEDURE — 99214 OFFICE O/P EST MOD 30 MIN: CPT | Mod: TH,,, | Performed by: OBSTETRICS & GYNECOLOGY

## 2024-03-05 PROCEDURE — 99499 UNLISTED E&M SERVICE: CPT | Mod: ,,, | Performed by: OBSTETRICS & GYNECOLOGY

## 2024-03-05 PROCEDURE — 3075F SYST BP GE 130 - 139MM HG: CPT | Mod: CPTII,,, | Performed by: OBSTETRICS & GYNECOLOGY

## 2024-03-05 PROCEDURE — 3078F DIAST BP <80 MM HG: CPT | Mod: CPTII,,, | Performed by: OBSTETRICS & GYNECOLOGY

## 2024-03-05 PROCEDURE — 76802 OB US < 14 WKS ADDL FETUS: CPT | Mod: ,,, | Performed by: OBSTETRICS & GYNECOLOGY

## 2024-03-05 PROCEDURE — 1159F MED LIST DOCD IN RCRD: CPT | Mod: CPTII,,, | Performed by: OBSTETRICS & GYNECOLOGY

## 2024-03-05 PROCEDURE — 84702 CHORIONIC GONADOTROPIN TEST: CPT | Mod: ,,, | Performed by: CLINICAL MEDICAL LABORATORY

## 2024-03-05 PROCEDURE — 3008F BODY MASS INDEX DOCD: CPT | Mod: CPTII,,, | Performed by: OBSTETRICS & GYNECOLOGY

## 2024-03-05 PROCEDURE — 81025 URINE PREGNANCY TEST: CPT | Mod: QW,,, | Performed by: OBSTETRICS & GYNECOLOGY

## 2024-03-05 RX ORDER — ONDANSETRON 4 MG/1
4 TABLET, FILM COATED ORAL EVERY 6 HOURS PRN
Qty: 90 TABLET | Refills: 3 | Status: SHIPPED | OUTPATIENT
Start: 2024-03-05

## 2024-03-05 NOTE — PROGRESS NOTES
CC: Positive Pregnancy Test    HISTORY OF PRESENT ILLNESS:    Connie Cook is a 24 y.o. female, ,  Presents today for a routine exam complaining of amenorrhea and positive home urine pregnancy test.  Patient's last menstrual period was 2024 (approximate).   She is not currently on any contraception.  Reports nausea. Reports breast tenderness. Denies vaginal bleeding and pelvic pain.      Review of patient's allergies indicates:   Allergen Reactions    Strawberries [strawberry] Swelling     itching       Past Medical History:   Diagnosis Date    Allergy     Asthma     Screening examination for STD (sexually transmitted disease) 2023     Past Surgical History:   Procedure Laterality Date    COLPOSCOPY, WITH BIOPSY OF CERVIX       OB History          2    Para   0    Term                AB        Living   0         SAB        IAB        Ectopic        Multiple        Live Births               Obstetric Comments   1 miscarriage-Dec 2020  8 mo gestation/ vaginally delivered, early rupture of membranes             Family History   Problem Relation Age of Onset    Diabetes Father     Diabetes Maternal Grandmother     No Known Problems Mother     No Known Problems Sister     No Known Problems Brother     Diabetes Maternal Aunt     No Known Problems Maternal Uncle     No Known Problems Paternal Aunt     Stroke Maternal Grandfather     No Known Problems Paternal Grandmother     No Known Problems Paternal Grandfather      Social History     Tobacco Use    Smoking status: Never     Passive exposure: Never    Smokeless tobacco: Never   Substance Use Topics    Alcohol use: Not Currently     Comment: occassional    Drug use: Not Currently     Types: Marijuana     Comment: occassional       Current Outpatient Medications   Medication Sig    ondansetron (ZOFRAN) 4 MG tablet Take 1 tablet (4 mg total) by mouth every 6 (six) hours as needed for Nausea.    PNV,calcium 72-iron-folic acid  (PRENATAL VITAMIN PLUS LOW IRON) 27 mg iron- 1 mg Tab Take 1 tablet (1 each total) by mouth once daily.     No current facility-administered medications for this visit.       OB History    Para Term  AB Living   2 0       0   SAB IAB Ectopic Multiple Live Births                  # Outcome Date GA Lbr Shaw/2nd Weight Sex Delivery Anes PTL Lv   2 Current            1                Obstetric Comments   1 miscarriage-Dec 2020  8 mo gestation/ vaginally delivered, early rupture of membranes          ROS:  GENERAL: No weight changes. No swelling. No fatigue. No fever.  CARDIOVASCULAR: No chest pain. No shortness of breath. No leg cramps.   NEUROLOGICAL: No headaches. No vision changes.  BREASTS: No pain. No lumps. No discharge.  ABDOMEN: No pain. No diarrhea. No constipation.  REPRODUCTIVE: No abnormal bleeding.   VULVA: No pain. No lesions. No itching.  VAGINA: No relaxation. No itching. No odor. No discharge. No lesions.  URINARY: No incontinence. No nocturia. No frequency. No dysuria.    /76   Pulse 97   Wt 96.1 kg (211 lb 12.8 oz)   LMP 2024 (Approximate)   BMI 31.28 kg/m²     PE:  AFFECT: Calm, alert and oriented X 3. Interactive during exam  GENERAL: Appears well-nourished, well-developed, in no acute distress.  HEAD: Normocephalic, atruamatic  TEETH: Good dentition.  THYROID: No thyromegally   BREASTS: No masses, skin changes, nipple discharge or adenopathy bilaterally.  SKIN: Normal for race, warm, & dry. No lesions or rashes.  LUNGS: Easy and unlabored, clear to auscultation bilaterally.  HEART: Regular rate and rhythm   ABDOMEN: Soft and nontender without masses or organomegally.  VULVA: No lesions, masses or tenderness.  VAGINA: Moist and well rugated without lesions or discharge.  CERVIX: Moist and pink without lesions, discharge or tenderness.      UTERUS SIZE: 5 week size, nontender and without masses.  ADNEXA: No masses or tenderness.  ESTIMATE OF PELVIC CAPACITY:  Adequate  EXTREMITIES: No cyanosis, clubbing or edema. No calf tenderness.  LYMPH NODES: No axillary or inguinal adenopathy.      ASSESSMENT:       ICD-10-CM ICD-9-CM    1. Amenorrhea, secondary  N91.1 626.0 POCT urine pregnancy      US OB <14 Wks, TransAbd, Single Gestation      hCG, Total, Quantitative      hCG, Total, Quantitative      2. Nausea and vomiting during pregnancy  O21.9 643.90 ondansetron (ZOFRAN) 4 MG tablet      3. Positive pregnancy test  Z32.01 V72.42              Plan:     Amenorrhea  Positive urine pregnancy test (JEAN-CLAUDE: 2024, EGA: 5w 1d based on LMP)    -  Routine prenatal care    Nausea and vomiting in pregnancy    -  Education regarding lifestyle and dietary modifications    -  Advised use of B6/Unisom. Pt will notify us if no relief/worsening symptoms, will consider Zofran if needed.      1st TRIMESTER COUNSELING: Discussed all, booklet provided:  Common complaints of pregnancy  HIV and other routine prenatal tests including  genetic screening  Risk factors identified by prenatal history  Oriented to practice - discussed anticipated course of prenatal care & indications for Ultrasound  Childbirth classes/Hospital facilities   Nutrition and weight gain counseling  Toxoplasmosis precautions (Cats/Raw Meat)  Sexual activity and exercise  Environmental/Work hazards  Travel  Tobacco (Ask, Advise, Assess, Assist, and Arrange), as well as alcohol and drug use  Use of any medications (Including supplements, Vitamins, Herbs, or OTC Drugs)  Domestic violence  Seat belt use      TERATOLOGY COUNSELING:   Discussed indications and options for aneuploidy screening - pamphlets given    -  Pt desires Veronica testing.  Dating US today  Follow up in about 2 weeks (around 3/19/2024) for 2 week  and CNY in 6 weeks.    Martha Barton M.D., FCOG    OB/GYN

## 2024-03-06 LAB — HCG SERPL-ACNC: NORMAL MIU/ML

## 2024-03-12 RX ORDER — METRONIDAZOLE 500 MG/1
500 TABLET ORAL 2 TIMES DAILY
Qty: 14 TABLET | Refills: 0 | OUTPATIENT
Start: 2024-03-12 | End: 2024-03-19

## 2024-03-13 ENCOUNTER — NURSE TRIAGE (OUTPATIENT)
Dept: ADMINISTRATIVE | Facility: CLINIC | Age: 24
End: 2024-03-13

## 2024-03-13 NOTE — TELEPHONE ENCOUNTER
Pt is pregnant 7 weeks. Pt reports N/V and dizziness. Pt reports she is unable to hold anything down since yesterday. Denies vomiting today. Reports nausea medication helps but not for long. Pt reports she has not vomited today. Also says she is able to drink fluids but has no appetite because food makes her vomit.    Dispo is for home care. Care advice given. Pt verbalized understanding.     Reason for Disposition   Nausea or vomiting    Additional Information   Negative: Sounds like a life-threatening emergency to the triager   Negative: Vomiting red blood or black (coffee ground) material   Negative: Insulin-dependent diabetes (Type I) and glucose > 400 mg/dL (22 mmol/L)   Negative: Recent head injury (within last 3 days)   Negative: Recent abdominal injury (within last 3 days)   Negative: Severe pain in one eye   Negative: SEVERE vomiting (e.g., > 6 times / day) and present > 8 hours   Negative: Unable to keep ANY liquids down (without vomiting) this past 24 hours   Negative: Patient sounds very sick or weak to the triager   Negative: MODERATE vomiting (e.g., 3 - 5 times / day) and present > 3 days   Negative: Drinking very little and has signs of dehydration (e.g., no urine > 12 hours, very dry mouth)   Negative: Weight loss > 5 pounds (2.5 kg) in last 2 weeks   Negative: Pain or burning with passing urine (urination)   Negative: Substance use (drug use) or unhealthy alcohol use, known or suspected   Negative: High-risk adult (e.g., diabetes mellitus, AIDS/HIV)   Negative: Patient wants to be seen   Negative: Vomiting an essential or prescribed medication (Exception: Prenatal vitamins.)   Negative: MILD vomiting (e.g., 1-2 times / day) and present > 3 days and started after the 9th week of pregnancy   Negative: MILD vomiting and present > 1 week and no improvement after using Morning Sickness Care Advice   Negative: MILD vomiting and present > 1 week with no improvement after using Care Advice    Protocols  used: Pregnancy - Morning Sickness (Nausea and Vomiting of Pregnancy)-A-OH

## 2024-03-19 ENCOUNTER — PROCEDURE VISIT (OUTPATIENT)
Dept: OBSTETRICS AND GYNECOLOGY | Facility: CLINIC | Age: 24
End: 2024-03-19
Payer: MEDICAID

## 2024-03-19 DIAGNOSIS — N91.1 AMENORRHEA, SECONDARY: Primary | ICD-10-CM

## 2024-03-19 PROCEDURE — 99499 UNLISTED E&M SERVICE: CPT | Mod: ,,, | Performed by: OBSTETRICS & GYNECOLOGY

## 2024-03-19 PROCEDURE — 76802 OB US < 14 WKS ADDL FETUS: CPT | Mod: ,,, | Performed by: OBSTETRICS & GYNECOLOGY

## 2024-03-19 PROCEDURE — 76801 OB US < 14 WKS SINGLE FETUS: CPT | Mod: ,,, | Performed by: OBSTETRICS & GYNECOLOGY

## 2024-04-08 ENCOUNTER — HOSPITAL ENCOUNTER (EMERGENCY)
Facility: HOSPITAL | Age: 24
Discharge: HOME | End: 2024-04-08
Payer: MEDICAID

## 2024-04-08 VITALS
HEIGHT: 69 IN | WEIGHT: 198 LBS | RESPIRATION RATE: 17 BRPM | SYSTOLIC BLOOD PRESSURE: 115 MMHG | DIASTOLIC BLOOD PRESSURE: 73 MMHG | HEART RATE: 100 BPM | BODY MASS INDEX: 29.33 KG/M2 | OXYGEN SATURATION: 99 % | TEMPERATURE: 99 F

## 2024-04-08 DIAGNOSIS — O21.0 HYPEREMESIS GRAVIDARUM: Primary | ICD-10-CM

## 2024-04-08 DIAGNOSIS — Z3A.10 10 WEEKS GESTATION OF PREGNANCY: ICD-10-CM

## 2024-04-08 DIAGNOSIS — E86.0 MILD DEHYDRATION: ICD-10-CM

## 2024-04-08 LAB
ANION GAP SERPL CALCULATED.3IONS-SCNC: 15 MMOL/L (ref 7–16)
BACTERIA #/AREA URNS HPF: ABNORMAL /HPF
BASOPHILS # BLD AUTO: 0.03 K/UL (ref 0–0.2)
BASOPHILS NFR BLD AUTO: 0.6 % (ref 0–1)
BILIRUB UR QL STRIP: NEGATIVE
BUN SERPL-MCNC: 7 MG/DL (ref 7–18)
BUN/CREAT SERPL: 12 (ref 6–20)
CALCIUM SERPL-MCNC: 9.2 MG/DL (ref 8.5–10.1)
CHLORIDE SERPL-SCNC: 103 MMOL/L (ref 98–107)
CLARITY UR: CLEAR
CO2 SERPL-SCNC: 23 MMOL/L (ref 21–32)
COLOR UR: YELLOW
CREAT SERPL-MCNC: 0.6 MG/DL (ref 0.55–1.02)
DIFFERENTIAL METHOD BLD: ABNORMAL
EGFR (NO RACE VARIABLE) (RUSH/TITUS): 129 ML/MIN/1.73M2
EOSINOPHIL # BLD AUTO: 0.01 K/UL (ref 0–0.5)
EOSINOPHIL NFR BLD AUTO: 0.2 % (ref 1–4)
ERYTHROCYTE [DISTWIDTH] IN BLOOD BY AUTOMATED COUNT: 12 % (ref 11.5–14.5)
GLUCOSE SERPL-MCNC: 86 MG/DL (ref 74–106)
GLUCOSE UR STRIP-MCNC: NORMAL MG/DL
HCG SERPL-ACNC: NORMAL MIU/ML
HCT VFR BLD AUTO: 34.2 % (ref 38–47)
HGB BLD-MCNC: 11.7 G/DL (ref 12–16)
IMM GRANULOCYTES # BLD AUTO: 0.01 K/UL (ref 0–0.04)
IMM GRANULOCYTES NFR BLD: 0.2 % (ref 0–0.4)
KETONES UR STRIP-SCNC: 10 MG/DL
LEUKOCYTE ESTERASE UR QL STRIP: NEGATIVE
LYMPHOCYTES # BLD AUTO: 1.62 K/UL (ref 1–4.8)
LYMPHOCYTES NFR BLD AUTO: 30.4 % (ref 27–41)
MCH RBC QN AUTO: 30.4 PG (ref 27–31)
MCHC RBC AUTO-ENTMCNC: 34.2 G/DL (ref 32–36)
MCV RBC AUTO: 88.8 FL (ref 80–96)
MONOCYTES # BLD AUTO: 0.59 K/UL (ref 0–0.8)
MONOCYTES NFR BLD AUTO: 11.1 % (ref 2–6)
MPC BLD CALC-MCNC: 11.9 FL (ref 9.4–12.4)
MUCOUS, UA: ABNORMAL /LPF
NEUTROPHILS # BLD AUTO: 3.07 K/UL (ref 1.8–7.7)
NEUTROPHILS NFR BLD AUTO: 57.5 % (ref 53–65)
NITRITE UR QL STRIP: NEGATIVE
NRBC # BLD AUTO: 0 X10E3/UL
NRBC, AUTO (.00): 0 %
PH UR STRIP: 7.5 PH UNITS
PLATELET # BLD AUTO: 255 K/UL (ref 150–400)
POTASSIUM SERPL-SCNC: 3.9 MMOL/L (ref 3.5–5.1)
PROT UR QL STRIP: 50
RBC # BLD AUTO: 3.85 M/UL (ref 4.2–5.4)
RBC # UR STRIP: NEGATIVE /UL
RBC #/AREA URNS HPF: 2 /HPF
SODIUM SERPL-SCNC: 137 MMOL/L (ref 136–145)
SP GR UR STRIP: 1.03
SQUAMOUS #/AREA URNS LPF: ABNORMAL /HPF
UROBILINOGEN UR STRIP-ACNC: 3 MG/DL
WBC # BLD AUTO: 5.33 K/UL (ref 4.5–11)
WBC #/AREA URNS HPF: 1 /HPF

## 2024-04-08 PROCEDURE — 96361 HYDRATE IV INFUSION ADD-ON: CPT

## 2024-04-08 PROCEDURE — 99284 EMERGENCY DEPT VISIT MOD MDM: CPT | Mod: ,,, | Performed by: NURSE PRACTITIONER

## 2024-04-08 PROCEDURE — 96374 THER/PROPH/DIAG INJ IV PUSH: CPT

## 2024-04-08 PROCEDURE — 80048 BASIC METABOLIC PNL TOTAL CA: CPT | Performed by: NURSE PRACTITIONER

## 2024-04-08 PROCEDURE — 81003 URINALYSIS AUTO W/O SCOPE: CPT | Performed by: NURSE PRACTITIONER

## 2024-04-08 PROCEDURE — 63600175 PHARM REV CODE 636 W HCPCS: Performed by: NURSE PRACTITIONER

## 2024-04-08 PROCEDURE — 99284 EMERGENCY DEPT VISIT MOD MDM: CPT | Mod: 25

## 2024-04-08 PROCEDURE — 25000003 PHARM REV CODE 250: Performed by: NURSE PRACTITIONER

## 2024-04-08 PROCEDURE — 85025 COMPLETE CBC W/AUTO DIFF WBC: CPT | Performed by: NURSE PRACTITIONER

## 2024-04-08 PROCEDURE — 84702 CHORIONIC GONADOTROPIN TEST: CPT | Performed by: NURSE PRACTITIONER

## 2024-04-08 RX ORDER — ONDANSETRON HYDROCHLORIDE 2 MG/ML
4 INJECTION, SOLUTION INTRAVENOUS
Status: COMPLETED | OUTPATIENT
Start: 2024-04-08 | End: 2024-04-08

## 2024-04-08 RX ORDER — PROMETHAZINE HYDROCHLORIDE 25 MG/1
25 TABLET ORAL EVERY 6 HOURS PRN
Qty: 15 TABLET | Refills: 0 | Status: SHIPPED | OUTPATIENT
Start: 2024-04-08

## 2024-04-08 RX ADMIN — SODIUM CHLORIDE 1000 ML: 9 INJECTION, SOLUTION INTRAVENOUS at 05:04

## 2024-04-08 RX ADMIN — ONDANSETRON 4 MG: 2 INJECTION INTRAMUSCULAR; INTRAVENOUS at 05:04

## 2024-04-08 NOTE — ED TRIAGE NOTES
Pt reports abd pain that started yesterday across the bottom. She is 10wks preg. She reports vomiting and unable to hold anything down.

## 2024-04-09 ENCOUNTER — TELEPHONE (OUTPATIENT)
Dept: EMERGENCY MEDICINE | Facility: HOSPITAL | Age: 24
End: 2024-04-09
Payer: MEDICAID

## 2024-04-09 NOTE — ED PROVIDER NOTES
Encounter Date: 2024       History     Chief Complaint   Patient presents with    Abdominal Pain    Emesis     Patient presents to the ER with complaint of lower abdominal pain and nausea and vomiting.  Patient reports she was 10 weeks pregnant.  She states her symptoms started yesterday.  She denies fever.  She denies diarrhea.  Patient was  1 para 0.  Her last menstrual period was 2024.  Patient was established care for this pregnancy with Dr. Carlton.    The history is provided by the patient. No  was used.     Review of patient's allergies indicates:   Allergen Reactions    Strawberries [strawberry] Swelling     itching     Past Medical History:   Diagnosis Date    Allergy     Asthma     Screening examination for STD (sexually transmitted disease) 2023     Past Surgical History:   Procedure Laterality Date    COLPOSCOPY, WITH BIOPSY OF CERVIX       Family History   Problem Relation Age of Onset    Diabetes Father     Diabetes Maternal Grandmother     No Known Problems Mother     No Known Problems Sister     No Known Problems Brother     Diabetes Maternal Aunt     No Known Problems Maternal Uncle     No Known Problems Paternal Aunt     Stroke Maternal Grandfather     No Known Problems Paternal Grandmother     No Known Problems Paternal Grandfather      Social History     Tobacco Use    Smoking status: Never     Passive exposure: Never    Smokeless tobacco: Never   Substance Use Topics    Alcohol use: Not Currently     Comment: occassional    Drug use: Not Currently     Types: Marijuana     Comment: occassional     Review of Systems   Constitutional:  Positive for activity change, appetite change and fatigue.   Gastrointestinal:  Positive for abdominal pain (Lower abdominal cramping), nausea and vomiting.   Genitourinary:  Negative for dysuria, urgency, vaginal bleeding and vaginal discharge.   All other systems reviewed and are negative.      Physical Exam      Initial Vitals [04/08/24 1634]   BP Pulse Resp Temp SpO2   115/73 100 17 98.7 °F (37.1 °C) 99 %      MAP       --         Physical Exam    Nursing note and vitals reviewed.  Constitutional: She appears well-developed and well-nourished.   HENT:   Head: Normocephalic.   Nose: Nose normal.   Mouth/Throat: Oropharynx is clear and moist.   Eyes: EOM are normal. Pupils are equal, round, and reactive to light.   Neck:   Normal range of motion.  Cardiovascular:  Normal rate, normal heart sounds and intact distal pulses.           Pulmonary/Chest: Breath sounds normal.   Abdominal: Abdomen is soft. Bowel sounds are normal.   Genitourinary:    No vaginal discharge.     Musculoskeletal:         General: Normal range of motion.      Cervical back: Normal range of motion.     Neurological: She is alert and oriented to person, place, and time. She has normal strength. GCS score is 15. GCS eye subscore is 4. GCS verbal subscore is 5. GCS motor subscore is 6.   Skin: Skin is warm and dry. Capillary refill takes less than 2 seconds.   Psychiatric: She has a normal mood and affect. Her behavior is normal. Judgment and thought content normal.         Medical Screening Exam   See Full Note    ED Course   Procedures  Labs Reviewed   URINALYSIS, REFLEX TO URINE CULTURE - Abnormal; Notable for the following components:       Result Value    Protein, UA 50 (*)     Ketones, UA 10 (*)     Urobilinogen, UA 3 (*)     Specific Gravity, UA 1.035 (*)     All other components within normal limits   CBC WITH DIFFERENTIAL - Abnormal; Notable for the following components:    RBC 3.85 (*)     Hemoglobin 11.7 (*)     Hematocrit 34.2 (*)     Monocytes % 11.1 (*)     Eosinophils % 0.2 (*)     All other components within normal limits   URINALYSIS, MICROSCOPIC - Abnormal; Notable for the following components:    Bacteria, UA Occasional (*)     Squamous Epithelial Cells, UA Occasional (*)     Mucous Few (*)     All other components within normal  limits   BASIC METABOLIC PANEL - Normal   CBC W/ AUTO DIFFERENTIAL    Narrative:     The following orders were created for panel order CBC auto differential.  Procedure                               Abnormality         Status                     ---------                               -----------         ------                     CBC with Differential[[id number]]       Abnormal            Final result                 Please view results for these tests on the individual orders.   HCG, TOTAL, QUANTITATIVE          Imaging Results    None          Medications   sodium chloride 0.9% bolus 1,000 mL 1,000 mL (0 mLs Intravenous Stopped 24)   ondansetron injection 4 mg (4 mg Intravenous Given 24)     Medical Decision Making  Patient presents to the ER with complaint of lower abdominal pain and nausea and vomiting.  Patient reports she was 10 weeks pregnant.  She states her symptoms started yesterday.  She denies fever.  She denies diarrhea.  Patient was  1 para 0.  Her last menstrual period was 2024.  Patient was established care for this pregnancy with Dr. Carlton.      Amount and/or Complexity of Data Reviewed  Labs: ordered. Decision-making details documented in ED Course.     Details: UA negative for leukocytes 1 white blood cell  Discussion of management or test interpretation with external provider(s): Initiate IV, collect lab work  1 L normal saline bolus  Zofran 4 mg IV to treat nausea    Patient verbalized improvement of nausea prior to discharge.  Patient was diagnosed with hyperemesis gravidarum mild dehydration 10 weeks' gestation of pregnancy.  She was encouraged to increase her fluid intake to keep hydrated.  She was given a prescription for promethazine to take as prescribed.  She was told to follow up with her OBGYN provider in 2-3 days for recheck if symptoms do not improve.  Patient verbalizes understanding agrees with plan of care.      Risk  Prescription drug  management.                                      Clinical Impression:   Final diagnoses:  [O21.0] Hyperemesis gravidarum (Primary)  [E86.0] Mild dehydration  [Z3A.10] 10 weeks gestation of pregnancy        ED Disposition Condition    Discharge Stable          ED Prescriptions       Medication Sig Dispense Start Date End Date Auth. Provider    promethazine (PHENERGAN) 25 MG tablet Take 1 tablet (25 mg total) by mouth every 6 (six) hours as needed for Nausea. 15 tablet 4/8/2024 -- Deisi Garcia FNP          Follow-up Information       Follow up With Specialties Details Why Contact Info    Martha Barton MD Obstetrics and Gynecology Schedule an appointment as soon as possible for a visit in 2 days If symptoms worsen [address] [place] [address]  [facility] [address]  [phone]               Deisi Garcia FNP  04/09/24 0011

## 2024-04-09 NOTE — DISCHARGE INSTRUCTIONS
Take medications as prescribed.  Increase fluid intake to keep hydrated.  Follow up with Dr. Carlton in 2-3 days for recheck.  Return to the ER with new or worsening symptoms.

## 2024-04-11 ENCOUNTER — PROCEDURE VISIT (OUTPATIENT)
Dept: OBSTETRICS AND GYNECOLOGY | Facility: CLINIC | Age: 24
End: 2024-04-11
Payer: MEDICAID

## 2024-04-11 ENCOUNTER — ROUTINE PRENATAL (OUTPATIENT)
Dept: OBSTETRICS AND GYNECOLOGY | Facility: CLINIC | Age: 24
End: 2024-04-11
Payer: MEDICAID

## 2024-04-11 VITALS
SYSTOLIC BLOOD PRESSURE: 122 MMHG | DIASTOLIC BLOOD PRESSURE: 74 MMHG | HEART RATE: 99 BPM | WEIGHT: 202.81 LBS | BODY MASS INDEX: 29.95 KG/M2

## 2024-04-11 DIAGNOSIS — Z11.3 SCREEN FOR STD (SEXUALLY TRANSMITTED DISEASE): ICD-10-CM

## 2024-04-11 DIAGNOSIS — O30.041 DICHORIONIC DIAMNIOTIC TWIN PREGNANCY IN FIRST TRIMESTER: ICD-10-CM

## 2024-04-11 DIAGNOSIS — Z72.51 HIGH RISK SEXUAL BEHAVIOR, UNSPECIFIED TYPE: ICD-10-CM

## 2024-04-11 DIAGNOSIS — Z36.9 ANTENATAL SCREENING ENCOUNTER: Primary | ICD-10-CM

## 2024-04-11 DIAGNOSIS — Z36.89 ENCOUNTER FOR OTHER SPECIFIED ANTENATAL SCREENING: ICD-10-CM

## 2024-04-11 DIAGNOSIS — Z3A.10 10 WEEKS GESTATION OF PREGNANCY: ICD-10-CM

## 2024-04-11 DIAGNOSIS — Z11.4 ENCOUNTER FOR SCREENING FOR HIV: ICD-10-CM

## 2024-04-11 DIAGNOSIS — Z3A.10 10 WEEKS GESTATION OF PREGNANCY: Primary | ICD-10-CM

## 2024-04-11 LAB
BASOPHILS # BLD AUTO: 0.02 K/UL (ref 0–0.2)
BASOPHILS NFR BLD AUTO: 0.3 % (ref 0–1)
BILIRUB SERPL-MCNC: NORMAL MG/DL
BLOOD, POC UA: NORMAL
DIFFERENTIAL METHOD BLD: ABNORMAL
EOSINOPHIL # BLD AUTO: 0.03 K/UL (ref 0–0.5)
EOSINOPHIL NFR BLD AUTO: 0.5 % (ref 1–4)
ERYTHROCYTE [DISTWIDTH] IN BLOOD BY AUTOMATED COUNT: 11.9 % (ref 11.5–14.5)
EST. AVERAGE GLUCOSE BLD GHB EST-MCNC: 97 MG/DL
GLUCOSE UR QL STRIP: NORMAL
HAV IGM SER QL: NORMAL
HBA1C MFR BLD HPLC: 5 % (ref 4.5–6.6)
HBV CORE IGM SER QL: NORMAL
HBV SURFACE AG SERPL QL IA: NORMAL
HCT VFR BLD AUTO: 33.5 % (ref 38–47)
HCV AB SER QL: NORMAL
HGB BLD-MCNC: 11.5 G/DL (ref 12–16)
HIV 1+O+2 AB SERPL QL: NORMAL
IMM GRANULOCYTES # BLD AUTO: 0.02 K/UL (ref 0–0.04)
IMM GRANULOCYTES NFR BLD: 0.3 % (ref 0–0.4)
INDIRECT COOMBS: NORMAL
KETONES UR QL STRIP: NORMAL
LEUKOCYTE ESTERASE URINE, POC: NORMAL
LYMPHOCYTES # BLD AUTO: 1.46 K/UL (ref 1–4.8)
LYMPHOCYTES NFR BLD AUTO: 23.5 % (ref 27–41)
MCH RBC QN AUTO: 30.6 PG (ref 27–31)
MCHC RBC AUTO-ENTMCNC: 34.3 G/DL (ref 32–36)
MCV RBC AUTO: 89.1 FL (ref 80–96)
MONOCYTES # BLD AUTO: 0.63 K/UL (ref 0–0.8)
MONOCYTES NFR BLD AUTO: 10.1 % (ref 2–6)
MPC BLD CALC-MCNC: 12 FL (ref 9.4–12.4)
NEUTROPHILS # BLD AUTO: 4.05 K/UL (ref 1.8–7.7)
NEUTROPHILS NFR BLD AUTO: 65.3 % (ref 53–65)
NITRITE, POC UA: NORMAL
NRBC # BLD AUTO: 0 X10E3/UL
NRBC, AUTO (.00): 0 %
PH, POC UA: 7
PLATELET # BLD AUTO: 261 K/UL (ref 150–400)
PROTEIN, POC: NORMAL
RBC # BLD AUTO: 3.76 M/UL (ref 4.2–5.4)
RH BLD: NORMAL
RUBV IGG SER-ACNC: NORMAL [IU]/ML
SPECIFIC GRAVITY, POC UA: 1.01
SPECIMEN OUTDATE: NORMAL
SYPHILIS AB INTERPRETATION: NORMAL
TRICHOMONAS NAT: NEGATIVE
UROBILINOGEN, POC UA: 2
WBC # BLD AUTO: 6.21 K/UL (ref 4.5–11)

## 2024-04-11 PROCEDURE — 87491 CHLMYD TRACH DNA AMP PROBE: CPT | Mod: ,,, | Performed by: CLINICAL MEDICAL LABORATORY

## 2024-04-11 PROCEDURE — 86850 RBC ANTIBODY SCREEN: CPT | Mod: ,,, | Performed by: CLINICAL MEDICAL LABORATORY

## 2024-04-11 PROCEDURE — 86901 BLOOD TYPING SEROLOGIC RH(D): CPT | Mod: ,,, | Performed by: CLINICAL MEDICAL LABORATORY

## 2024-04-11 PROCEDURE — 87086 URINE CULTURE/COLONY COUNT: CPT | Mod: ,,, | Performed by: CLINICAL MEDICAL LABORATORY

## 2024-04-11 PROCEDURE — 87591 N.GONORRHOEAE DNA AMP PROB: CPT | Mod: ,,, | Performed by: CLINICAL MEDICAL LABORATORY

## 2024-04-11 PROCEDURE — 83036 HEMOGLOBIN GLYCOSYLATED A1C: CPT | Mod: ,,, | Performed by: CLINICAL MEDICAL LABORATORY

## 2024-04-11 PROCEDURE — 86900 BLOOD TYPING SEROLOGIC ABO: CPT | Mod: ,,, | Performed by: CLINICAL MEDICAL LABORATORY

## 2024-04-11 PROCEDURE — 80074 ACUTE HEPATITIS PANEL: CPT | Mod: ,,, | Performed by: CLINICAL MEDICAL LABORATORY

## 2024-04-11 PROCEDURE — 86762 RUBELLA ANTIBODY: CPT | Mod: ,,, | Performed by: CLINICAL MEDICAL LABORATORY

## 2024-04-11 PROCEDURE — 86780 TREPONEMA PALLIDUM: CPT | Mod: ,,, | Performed by: CLINICAL MEDICAL LABORATORY

## 2024-04-11 PROCEDURE — 85025 COMPLETE CBC W/AUTO DIFF WBC: CPT | Mod: ,,, | Performed by: CLINICAL MEDICAL LABORATORY

## 2024-04-11 PROCEDURE — 36415 COLL VENOUS BLD VENIPUNCTURE: CPT | Mod: ,,, | Performed by: OBSTETRICS & GYNECOLOGY

## 2024-04-11 PROCEDURE — 87661 TRICHOMONAS VAGINALIS AMPLIF: CPT | Mod: ,,, | Performed by: CLINICAL MEDICAL LABORATORY

## 2024-04-11 PROCEDURE — 99499 UNLISTED E&M SERVICE: CPT | Mod: ,,, | Performed by: OBSTETRICS & GYNECOLOGY

## 2024-04-11 PROCEDURE — 85660 RBC SICKLE CELL TEST: CPT | Mod: ,,, | Performed by: CLINICAL MEDICAL LABORATORY

## 2024-04-11 PROCEDURE — 99214 OFFICE O/P EST MOD 30 MIN: CPT | Mod: TH,,, | Performed by: OBSTETRICS & GYNECOLOGY

## 2024-04-11 PROCEDURE — 76801 OB US < 14 WKS SINGLE FETUS: CPT | Mod: ,,, | Performed by: OBSTETRICS & GYNECOLOGY

## 2024-04-11 PROCEDURE — 76802 OB US < 14 WKS ADDL FETUS: CPT | Mod: ,,, | Performed by: OBSTETRICS & GYNECOLOGY

## 2024-04-11 PROCEDURE — 87389 HIV-1 AG W/HIV-1&-2 AB AG IA: CPT | Mod: ,,, | Performed by: CLINICAL MEDICAL LABORATORY

## 2024-04-11 NOTE — PROGRESS NOTES
24 y.o. female  at 10w6d   Reports fetal movement or fluttering. Denies any vaginal bleeding, leakage of fluid, cramping, contractions, or pressure.   She complains of nausea  Pt states she is doing well without any concerns.     Vitals  BP: 122/74  Pulse: 99  Weight: 92 kg (202 lb 12.8 oz)  Prenatal  Fetal Heart Rate: 160s  Movement: Absent  Edema  LLE Edema: None  RLE Edema: None  Facial: None  Additional Edema?: No    Prenatal Labs:  Lab Results   Component Value Date    GROUPTRH B POS 2024    HGB 11.5 (L) 2024    HCT 33.5 (L) 2024     2024    HEPBSAG Non-Reactive 2024    [id number] Non-Reactive 2024    LABNGO Negative 2023    LABURIN >100,000 Coagulase-negative Staphylococcus species (A) 2021    [id number] Negative 2021       The following were addressed during this visit:    1-8 Weeks  - Lifestyle Discussion   - Warning Signs   - Course of Care   - Physiology of Pregnancy   - Nutrition and Supplements   - Domestic Abuse Screen   - HIV Counseling   - Smoking Intervention   - SPAAD/Insurance Verification   - Importance of Exclusive Breastfeeding for First 6 Months   - Continuation of Breastfeeding of Complimentary after intro of solid foods   - Benefits of Breastfeeding     8-12 Weeks  - Review lab tests   - Genetic Counseling (NT/CVS/Amino)   - Influenza IM (for due date  - 3/31)   - Non-pharmacologic Pain Relief Methods for Labor & Birth       Daily fetal kick counts, bleeding, and  labor/labor precautions discussed.  Questions answered to desired level of satisfaction  Verbalized understanding to all information and instructions provided.    Total weight gain/weight loss in pregnancy: Not found.     Follow up in about 4 weeks (around 2024) for CYN.    A: 10w6d     ICD-10-CM ICD-9-CM    1.  screening encounter  Z36.9 V28.9 Drug Screen Definitive 14, Urine      Misc Sendout Test, Blood jennifer      CBC Auto Differential       Type & Screen      Rubella Antibody Screen      Sickle Cell Screen      Treponema Pallidum (Syphillis) Antibody      Chlamydia/GC, PCR      Hemoglobin A1C      Trichomonas vaginalis by PCR      Urine culture      HIV 1/2 Ag/Ab (4th Gen)      POCT Urinalysis      Hepatitis Panel, Acute      Misc Sendout Test, Blood jennifer      CBC Auto Differential      Type & Screen      Rubella Antibody Screen      Sickle Cell Screen      Treponema Pallidum (Syphillis) Antibody      Hemoglobin A1C      HIV 1/2 Ag/Ab (4th Gen)      Hepatitis Panel, Acute      Chlamydia/GC, PCR      Trichomonas vaginalis by PCR      Urine culture      2. 10 weeks gestation of pregnancy  Z3A.10 V22.2 Drug Screen Definitive 14, Urine      Misc Sendout Test, Blood jennifer      CBC Auto Differential      Type & Screen      Rubella Antibody Screen      Sickle Cell Screen      Treponema Pallidum (Syphillis) Antibody      Chlamydia/GC, PCR      Hemoglobin A1C      Trichomonas vaginalis by PCR      Urine culture      HIV 1/2 Ag/Ab (4th Gen)      POCT Urinalysis      Hepatitis Panel, Acute      Misc Sendout Test, Blood jennifer      CBC Auto Differential      Type & Screen      Rubella Antibody Screen      Sickle Cell Screen      Treponema Pallidum (Syphillis) Antibody      Hemoglobin A1C      HIV 1/2 Ag/Ab (4th Gen)      Hepatitis Panel, Acute      Chlamydia/GC, PCR      Trichomonas vaginalis by PCR      Urine culture      3. Screen for STD (sexually transmitted disease)  Z11.3 V74.5 Treponema Pallidum (Syphillis) Antibody      Chlamydia/GC, PCR      Trichomonas vaginalis by PCR      HIV 1/2 Ag/Ab (4th Gen)      Hepatitis Panel, Acute      Treponema Pallidum (Syphillis) Antibody      HIV 1/2 Ag/Ab (4th Gen)      Hepatitis Panel, Acute      Chlamydia/GC, PCR      Trichomonas vaginalis by PCR      4. High risk sexual behavior, unspecified type  Z72.51 V69.2 Treponema Pallidum (Syphillis) Antibody      Chlamydia/GC, PCR      Trichomonas vaginalis by PCR      HIV  1/2 Ag/Ab (4th Gen)      Hepatitis Panel, Acute      Treponema Pallidum (Syphillis) Antibody      HIV 1/2 Ag/Ab (4th Gen)      Hepatitis Panel, Acute      Chlamydia/GC, PCR      Trichomonas vaginalis by PCR      5. Encounter for screening for HIV  Z11.4 V73.89 HIV 1/2 Ag/Ab (4th Gen)      HIV 1/2 Ag/Ab (4th Gen)      6. Encounter for other specified  screening  Z36.89 V28.9 Drug Screen Definitive 14, Urine      7. Dichorionic diamniotic twin pregnancy in first trimester  O30.041 651.03 US OB <14 Wks TransAbd & TransVag, Twin Gestation (xpd)     V91.03             Martha Barton M.D., FCOG    OB/GYN

## 2024-04-12 LAB
CHLAMYDIA BY PCR: NEGATIVE
N. GONORRHOEAE (GC) BY PCR: NEGATIVE

## 2024-04-13 LAB — UA COMPLETE W REFLEX CULTURE PNL UR: NORMAL

## 2024-04-16 LAB — HGB S BLD QL SOLY: NEGATIVE

## 2024-05-03 NOTE — PROCEDURES
Fetus a:  Crown-rump length 7 weeks 4 day   Fetal heart rate 165 beats per minute     Impression   Estimated gestational age 7 weeks 4 day   Estimated delivery November 1, 2024     Fetus B:  Crown-rump length 7 weeks 6 day   Fetal heart rate 169 beats per minute     Impression:   Estimated gestational age 7 weeks 6 day   Estimated delivery October 30, 2024   Twenty gestation with IUP with fetal heart tones visualized

## 2024-05-03 NOTE — PROCEDURES
New OB Ultrasound note:      Uterus 8.67 x 4.89 x 6.08 cm  Right ovary 3.04 x 2.66 x 2.31 cm  Left ovary 3.48 x 2.39 x 2.1 cm    Fetus a  Gestational sac 5 weeks 1 day     Fetus B gestational sac 5 weeks 3 day     Impression:  To gestational sac and yolk sac seen within endometrium   No fetal pole seen at this time  Estimated date of delivery by gestational sac only November 4, 2024   Estimated gestational age by gestational sac only 5 weeks 1 day   Twin gestation   Recommend correlation with beta HCG and repeat ultrasound in 1

## 2024-05-05 NOTE — PROCEDURES
Fetus a:     Crown-rump length 11 weeks 1 day   Fetal heart rate 164 beats per minute   Impression:   Estimated gestational age 11 weeks 1 day   Estimated delivery October 30, 2024     Fetus B:   Crown-rump length 11 weeks 3 day   Fetal heart rate 106 6 beats per minute     Impression:   Estimated gestational age 11 weeks 3 day   Estimated delivery October 28, 2024   Diamniotic dichorionic twin gestation

## 2024-05-15 ENCOUNTER — PROCEDURE VISIT (OUTPATIENT)
Dept: OBSTETRICS AND GYNECOLOGY | Facility: CLINIC | Age: 24
End: 2024-05-15
Attending: OBSTETRICS & GYNECOLOGY
Payer: MEDICAID

## 2024-05-15 ENCOUNTER — ROUTINE PRENATAL (OUTPATIENT)
Dept: OBSTETRICS AND GYNECOLOGY | Facility: CLINIC | Age: 24
End: 2024-05-15
Payer: MEDICAID

## 2024-05-15 VITALS
WEIGHT: 198.38 LBS | BODY MASS INDEX: 29.3 KG/M2 | HEART RATE: 91 BPM | SYSTOLIC BLOOD PRESSURE: 116 MMHG | DIASTOLIC BLOOD PRESSURE: 66 MMHG

## 2024-05-15 DIAGNOSIS — O30.041 DICHORIONIC DIAMNIOTIC TWIN PREGNANCY IN FIRST TRIMESTER: ICD-10-CM

## 2024-05-15 DIAGNOSIS — Z36.9 ANTENATAL SCREENING ENCOUNTER: Primary | ICD-10-CM

## 2024-05-15 DIAGNOSIS — Z3A.15 15 WEEKS GESTATION OF PREGNANCY: ICD-10-CM

## 2024-05-15 DIAGNOSIS — Z3A.15 15 WEEKS GESTATION OF PREGNANCY: Primary | ICD-10-CM

## 2024-05-15 LAB
BILIRUB SERPL-MCNC: NORMAL MG/DL
BLOOD, POC UA: NORMAL
GLUCOSE UR QL STRIP: NORMAL
KETONES UR QL STRIP: NORMAL
LEUKOCYTE ESTERASE URINE, POC: NORMAL
NITRITE, POC UA: NORMAL
PH, POC UA: 7
PROTEIN, POC: NORMAL
SPECIFIC GRAVITY, POC UA: 1.01
UROBILINOGEN, POC UA: 1

## 2024-05-15 PROCEDURE — 76810 OB US >/= 14 WKS ADDL FETUS: CPT | Mod: ,,, | Performed by: OBSTETRICS & GYNECOLOGY

## 2024-05-15 PROCEDURE — 99499 UNLISTED E&M SERVICE: CPT | Mod: ,,, | Performed by: OBSTETRICS & GYNECOLOGY

## 2024-05-15 PROCEDURE — 76805 OB US >/= 14 WKS SNGL FETUS: CPT | Mod: ,,, | Performed by: OBSTETRICS & GYNECOLOGY

## 2024-05-15 PROCEDURE — 82105 ALPHA-FETOPROTEIN SERUM: CPT | Mod: 90,,, | Performed by: CLINICAL MEDICAL LABORATORY

## 2024-05-15 PROCEDURE — 99213 OFFICE O/P EST LOW 20 MIN: CPT | Mod: TH,,, | Performed by: OBSTETRICS & GYNECOLOGY

## 2024-05-15 PROCEDURE — 36415 COLL VENOUS BLD VENIPUNCTURE: CPT | Mod: ,,, | Performed by: OBSTETRICS & GYNECOLOGY

## 2024-05-15 RX ORDER — ASPIRIN 81 MG/1
81 TABLET ORAL DAILY
Qty: 30 TABLET | Refills: 11 | COMMUNITY
Start: 2024-05-15 | End: 2024-06-12 | Stop reason: SDUPTHER

## 2024-05-15 NOTE — PROGRESS NOTES
24 y.o. female  at 15w5d   Reports fetal movement or fluttering. Denies any vaginal bleeding, leakage of fluid, cramping, contractions, or pressure.   She complains of nothing.  Pt states she is doing well without any concerns.     Vitals  BP: 116/66  Pulse: 91  Weight: 90 kg (198 lb 6.4 oz)  Prenatal  Fetal Heart Rate: 140s/150s  Movement: Absent  Urine Albumin/Glucose  Urine Albumin: Negative  Urine Glucose: Negative  Edema  LLE Edema: None  RLE Edema: None  Facial: None  Additional Edema?: No    Prenatal Labs:  Lab Results   Component Value Date    GROUPTRH B POS 2024    HGB 11.5 (L) 2024    HCT 33.5 (L) 2024     2024    SICKLE Negative 2024    HEPBSAG Non-Reactive 2024    [id number] Non-Reactive 2024    LABNGO Negative 2024    LABURIN Skin/Urogenital Viv Isolated, no further workup. 2024    [id number] Negative 2021       The following were addressed during this visit:    13-16 Weeks  - Quad screen   - Anatomy Ultrasound   - Breastfeeding Concerns & Resources   - Importance of Early Skin to Skin Contact       Daily fetal kick counts, bleeding, and  labor/labor precautions discussed.  Questions answered to desired level of satisfaction  Verbalized understanding to all information and instructions provided.    Total weight gain/weight loss in pregnancy: Not found.     Follow up in about 4 weeks (around 2024) for CYN.    A: 15w5d     ICD-10-CM ICD-9-CM    1.  screening encounter  Z36.9 V28.9 POCT Urinalysis      Misc Sendout Test, Blood MAFP1      Misc Sendout Test, Blood MAFP1      US OB 14+ Wks, TransAbd,Twin Gestation (xpd)      2. 15 weeks gestation of pregnancy  Z3A.15 V22.2 POCT Urinalysis      Misc Sendout Test, Blood MAFP1      Misc Sendout Test, Blood MAFP1      3. Dichorionic diamniotic twin pregnancy in first trimester  O30.041 651.03 US OB 14+ Wks, TransAbd,Twin Gestation (xpd)     V91.03             Urelaine  NILSON Barton., OG    OB/GYN

## 2024-06-05 ENCOUNTER — HOSPITAL ENCOUNTER (OUTPATIENT)
Dept: RADIOLOGY | Facility: HOSPITAL | Age: 24
Discharge: HOME | End: 2024-06-05
Attending: OBSTETRICS & GYNECOLOGY
Payer: MEDICAID

## 2024-06-05 DIAGNOSIS — Z36.9 ANTENATAL SCREENING ENCOUNTER: ICD-10-CM

## 2024-06-05 DIAGNOSIS — O30.041 DICHORIONIC DIAMNIOTIC TWIN PREGNANCY IN FIRST TRIMESTER: ICD-10-CM

## 2024-06-05 PROCEDURE — 76805 OB US >/= 14 WKS SNGL FETUS: CPT | Mod: 26,,, | Performed by: RADIOLOGY

## 2024-06-05 PROCEDURE — 76810 OB US >/= 14 WKS ADDL FETUS: CPT | Mod: 26,,, | Performed by: RADIOLOGY

## 2024-06-05 PROCEDURE — 76810 OB US >/= 14 WKS ADDL FETUS: CPT | Mod: TC

## 2024-06-07 NOTE — PROCEDURES
Ultrasound note:    Fetus a:   BPD 15 weeks 5 day   Abdominal circumference 16 weeks 5 day   Femur length 16 weeks 0 day   Fetal heart rate 140 beats per minute     Placenta location posterior   Fetal position breech    Impression:  Estimated gestational age 15 weeks 6 day   Estimated delivery October 31, 2024     Fetus B:  BPD 16 weeks 4 days   Head circumference 16 weeks 5 day   Abdominal circumference 15 weeks 6 day   Femur length 16 weeks 1 day    Fetal position transverse  Placenta location posterior   Fetal heart rate 154 beats per minute    Impression:  Estimated gestational age 16 weeks 2 day   Estimated delivery October 28, 2024

## 2024-06-12 ENCOUNTER — ROUTINE PRENATAL (OUTPATIENT)
Dept: OBSTETRICS AND GYNECOLOGY | Facility: CLINIC | Age: 24
End: 2024-06-12
Payer: MEDICAID

## 2024-06-12 ENCOUNTER — PROCEDURE VISIT (OUTPATIENT)
Dept: OBSTETRICS AND GYNECOLOGY | Facility: CLINIC | Age: 24
End: 2024-06-12
Payer: MEDICAID

## 2024-06-12 VITALS
WEIGHT: 204.19 LBS | HEART RATE: 86 BPM | DIASTOLIC BLOOD PRESSURE: 60 MMHG | BODY MASS INDEX: 30.16 KG/M2 | SYSTOLIC BLOOD PRESSURE: 99 MMHG

## 2024-06-12 DIAGNOSIS — Z3A.19 19 WEEKS GESTATION OF PREGNANCY: ICD-10-CM

## 2024-06-12 DIAGNOSIS — O30.041 DICHORIONIC DIAMNIOTIC TWIN PREGNANCY IN FIRST TRIMESTER: ICD-10-CM

## 2024-06-12 DIAGNOSIS — Z36.9 ANTENATAL SCREENING ENCOUNTER: Primary | ICD-10-CM

## 2024-06-12 DIAGNOSIS — O30.041 DICHORIONIC DIAMNIOTIC TWIN PREGNANCY IN FIRST TRIMESTER: Primary | ICD-10-CM

## 2024-06-12 LAB
BILIRUB SERPL-MCNC: NORMAL MG/DL
BLOOD, POC UA: NORMAL
GLUCOSE UR QL STRIP: NORMAL
KETONES UR QL STRIP: NORMAL
LEUKOCYTE ESTERASE URINE, POC: NORMAL
NITRITE, POC UA: NORMAL
PH, POC UA: 7.5
PROTEIN, POC: NORMAL
SPECIFIC GRAVITY, POC UA: 1.02
UROBILINOGEN, POC UA: 0.2

## 2024-06-12 PROCEDURE — 99499 UNLISTED E&M SERVICE: CPT | Mod: ,,, | Performed by: OBSTETRICS & GYNECOLOGY

## 2024-06-12 PROCEDURE — 76810 OB US >/= 14 WKS ADDL FETUS: CPT | Mod: ,,, | Performed by: OBSTETRICS & GYNECOLOGY

## 2024-06-12 PROCEDURE — 76805 OB US >/= 14 WKS SNGL FETUS: CPT | Mod: ,,, | Performed by: OBSTETRICS & GYNECOLOGY

## 2024-06-12 PROCEDURE — 59425 ANTEPARTUM CARE ONLY: CPT | Mod: TH,,, | Performed by: OBSTETRICS & GYNECOLOGY

## 2024-06-12 RX ORDER — ASPIRIN 81 MG/1
81 TABLET ORAL DAILY
COMMUNITY
Start: 2024-06-12 | End: 2025-03-19

## 2024-06-12 NOTE — PROGRESS NOTES
24 y.o. female  at 19w2d   Reports fetal movement or fluttering. Denies any vaginal bleeding, leakage of fluid, cramping, contractions, or pressure.   She complains of nothing.  Pt states she is doing well without any concerns.     Vitals  BP: 99/60  Pulse: 86  Weight: 92.6 kg (204 lb 3.2 oz)  Prenatal  Fetal Heart Rate: 160s/150s  Movement: Present  Urine Albumin/Glucose  Urine Albumin: Negative  Urine Glucose: Negative  Edema  LLE Edema: None  RLE Edema: None  Facial: None  Additional Edema?: No    Prenatal Labs:  Lab Results   Component Value Date    GROUPTRH B POS 2024    HGB 11.5 (L) 2024    HCT 33.5 (L) 2024     2024    SICKLE Negative 2024    HEPBSAG Non-Reactive 2024    [id number] Non-Reactive 2024    LABNGO Negative 2024    LABURIN Skin/Urogenital Viv Isolated, no further workup. 2024    [id number] Negative 2021       The following were addressed during this visit:    17-20 Weeks  - Quickening   - Lifestyle   - Ultrasound   - Importance of Early and Frequent Breastfeeding   - Baby-led Feeding   - Frequent feeding to help assure optimal milk production       Daily fetal kick counts, bleeding, and  labor/labor precautions discussed.  Questions answered to desired level of satisfaction  Verbalized understanding to all information and instructions provided.    Total weight gain/weight loss in pregnancy: Not found.     Follow up in about 4 weeks (around 7/10/2024) for CYN, w/ US.    A: 19w2d     ICD-10-CM ICD-9-CM    1.  screening encounter  Z36.9 V28.9 POCT Urinalysis      US OB 14+ Wks, TransAbd, Single Gestation      US OB <14 Wks TransAbd & TransVag, Twin Gestation (xpd)      US OB 14+ Wks, TransAbd,Twin Gestation (xpd)      CANCELED: US OB 14+ Wks, TransAbd, Single Gestation      2. 19 weeks gestation of pregnancy  Z3A.19 V22.2 POCT Urinalysis      US OB 14+ Wks, TransAbd, Single Gestation      US OB <14 Wks TransAbd &  TransVag, Twin Gestation (xpd)      US OB 14+ Wks, TransAbd,Twin Gestation (xpd)      CANCELED: US OB 14+ Wks, TransAbd, Single Gestation      3. Dichorionic diamniotic twin pregnancy in first trimester  O30.041 651.03 US OB 14+ Wks, TransAbd, Single Gestation     V91.03 US OB <14 Wks TransAbd & TransVag, Twin Gestation (xpd)      US OB 14+ Wks, TransAbd,Twin Gestation (xpd)      CANCELED: US OB 14+ Wks, TransAbd, Single Gestation            Martha Barton M.D., FCOG    OB/GYN

## 2024-06-13 ENCOUNTER — TELEPHONE (OUTPATIENT)
Dept: OBSTETRICS AND GYNECOLOGY | Facility: CLINIC | Age: 24
End: 2024-06-13
Payer: MEDICAID

## 2024-06-13 LAB — MAYO GENERIC ORDERABLE RESULT: NORMAL

## 2024-06-13 NOTE — TELEPHONE ENCOUNTER
Leena Machuca, LPN 6/13/2024 09:43 AM CDT  Spoke with Kizzy at [facility] lab and we called [facility] to update information.   18

## 2024-06-13 NOTE — TELEPHONE ENCOUNTER
----- Message from Martha Barton MD sent at 6/9/2024 11:58 PM CDT -----  Pt needs ultrasound information added to AFP for results

## 2024-06-16 NOTE — PROCEDURES
OB Ultrasound Note:  BPD- 20 weeks 2 day  HC - 20 weeks 0 day  Abdomina circumference- 19 weeks 0 day  Femur length- 24 day      Placental location-- posterior    Fetal heart rate- 157  Fetal position- transverse    Impression-    JEAN-CLAUDE October 30, 2024  Estmated gestational age 20 weeks 0 day    Fetus B:  OB ultrasound Note:    BPD- 20 week 6 day  HC-20 weeks 1 day  AC- 19 week 0 day  FL- 20 weeks 3 day        Fetal heart rate:  142 bpm    Fetal position- transverse  Placental location- posterior    Impression-    JEAN-CLAUDE October 29, 2024  Estimated gestational age 20 weeks 1 day

## 2024-06-24 ENCOUNTER — HOSPITAL ENCOUNTER (OUTPATIENT)
Dept: RADIOLOGY | Facility: HOSPITAL | Age: 24
Discharge: HOME | End: 2024-06-24
Attending: OBSTETRICS & GYNECOLOGY
Payer: MEDICAID

## 2024-06-24 DIAGNOSIS — O30.041 DICHORIONIC DIAMNIOTIC TWIN PREGNANCY IN FIRST TRIMESTER: ICD-10-CM

## 2024-06-24 DIAGNOSIS — Z3A.19 19 WEEKS GESTATION OF PREGNANCY: ICD-10-CM

## 2024-06-24 DIAGNOSIS — Z36.9 ANTENATAL SCREENING ENCOUNTER: ICD-10-CM

## 2024-06-24 PROCEDURE — 76810 OB US >/= 14 WKS ADDL FETUS: CPT | Mod: TC

## 2024-06-24 PROCEDURE — 76810 OB US >/= 14 WKS ADDL FETUS: CPT | Mod: 26,,, | Performed by: RADIOLOGY

## 2024-06-24 PROCEDURE — 76805 OB US >/= 14 WKS SNGL FETUS: CPT | Mod: 26,,, | Performed by: RADIOLOGY

## 2024-07-17 ENCOUNTER — ROUTINE PRENATAL (OUTPATIENT)
Dept: OBSTETRICS AND GYNECOLOGY | Facility: CLINIC | Age: 24
End: 2024-07-17
Payer: MEDICAID

## 2024-07-17 ENCOUNTER — PROCEDURE VISIT (OUTPATIENT)
Dept: OBSTETRICS AND GYNECOLOGY | Facility: CLINIC | Age: 24
End: 2024-07-17
Payer: MEDICAID

## 2024-07-17 VITALS
HEART RATE: 87 BPM | SYSTOLIC BLOOD PRESSURE: 103 MMHG | BODY MASS INDEX: 31.25 KG/M2 | DIASTOLIC BLOOD PRESSURE: 57 MMHG | WEIGHT: 211.63 LBS

## 2024-07-17 DIAGNOSIS — Z3A.24 24 WEEKS GESTATION OF PREGNANCY: ICD-10-CM

## 2024-07-17 DIAGNOSIS — Z36.9 ANTENATAL SCREENING ENCOUNTER: Primary | ICD-10-CM

## 2024-07-17 DIAGNOSIS — O30.041 DICHORIONIC DIAMNIOTIC TWIN PREGNANCY IN FIRST TRIMESTER: Primary | ICD-10-CM

## 2024-07-17 DIAGNOSIS — O30.041 DICHORIONIC DIAMNIOTIC TWIN PREGNANCY IN FIRST TRIMESTER: ICD-10-CM

## 2024-07-17 LAB
BILIRUB SERPL-MCNC: NEGATIVE MG/DL
BLOOD, POC UA: NEGATIVE
GLUCOSE UR QL STRIP: NEGATIVE
KETONES UR QL STRIP: NEGATIVE
LEUKOCYTE ESTERASE URINE, POC: NEGATIVE
NITRITE, POC UA: NEGATIVE
PH, POC UA: 7.5
PROTEIN, POC: NEGATIVE
SPECIFIC GRAVITY, POC UA: 1.02
UROBILINOGEN, POC UA: 0.2

## 2024-07-17 PROCEDURE — 59425 ANTEPARTUM CARE ONLY: CPT | Mod: TH,,, | Performed by: OBSTETRICS & GYNECOLOGY

## 2024-07-17 NOTE — PROGRESS NOTES
24 y.o. female  at 24w2d   Reports fetal movement or fluttering. Denies any vaginal bleeding, leakage of fluid, cramping, contractions, or pressure.   She complains of nothing.  Pt states she is doing well without any concerns.     Vitals  BP: (!) 103/57  Pulse: 87  Weight: 96 kg (211 lb 9.6 oz)  Prenatal  Fundal Height (cm): 32 cm  Fetal Heart Rate: 140s/150s  Movement: Present  Urine Albumin/Glucose  Urine Albumin: Negative  Urine Glucose: Negative  Edema  LLE Edema: None  RLE Edema: None  Facial: None  Additional Edema?: No    Prenatal Labs:  Lab Results   Component Value Date    GROUPTRH B POS 2024    HGB 11.5 (L) 2024    HCT 33.5 (L) 2024     2024    SICKLE Negative 2024    HEPBSAG Non-Reactive 2024    [id number] Non-Reactive 2024    LABNGO Negative 2024    LABURIN Skin/Urogenital Viv Isolated, no further workup. 2024    [id number] Negative 2021       The following were addressed during this visit:    21-24 Weeks  -  Labor Signs   - Travel During Pregnancy   - Gestational diabetes screening protocol   - Effective Position and Latch   - Risks of Formula Use   - Risks of pacifier use       Daily fetal kick counts, bleeding, and  labor/labor precautions discussed.  Questions answered to desired level of satisfaction  Verbalized understanding to all information and instructions provided.    Total weight gain/weight loss in pregnancy: Not found.     Follow up in about 4 weeks (around 2024) for CYN, w/ 1 hr GTT; 8 weeks CYN, CYN q 2 weeks x 2 then CYN q weekly till 40 weeks.    A: 24w2d     ICD-10-CM ICD-9-CM    1.  screening encounter  Z36.9 V28.9 POCT Urinalysis      Glucose, 1Hr Post Prandial      2. 24 weeks gestation of pregnancy  Z3A.24 V22.2 POCT Urinalysis      3. Dichorionic diamniotic twin pregnancy in first trimester  O30.041 651.03      V91.03             Martha Barton M.D.,  FCOG    OB/GYN

## 2024-08-08 ENCOUNTER — TELEPHONE (OUTPATIENT)
Dept: OBSTETRICS AND GYNECOLOGY | Facility: CLINIC | Age: 24
End: 2024-08-08
Payer: MEDICAID

## 2024-08-14 ENCOUNTER — PROCEDURE VISIT (OUTPATIENT)
Dept: OBSTETRICS AND GYNECOLOGY | Facility: CLINIC | Age: 24
End: 2024-08-14
Payer: MEDICAID

## 2024-08-14 ENCOUNTER — ROUTINE PRENATAL (OUTPATIENT)
Dept: OBSTETRICS AND GYNECOLOGY | Facility: CLINIC | Age: 24
End: 2024-08-14
Payer: MEDICAID

## 2024-08-14 VITALS
SYSTOLIC BLOOD PRESSURE: 111 MMHG | WEIGHT: 210.38 LBS | HEART RATE: 94 BPM | DIASTOLIC BLOOD PRESSURE: 61 MMHG | BODY MASS INDEX: 31.07 KG/M2

## 2024-08-14 DIAGNOSIS — Z36.9 ANTENATAL SCREENING ENCOUNTER: Primary | ICD-10-CM

## 2024-08-14 DIAGNOSIS — O30.041 DICHORIONIC DIAMNIOTIC TWIN PREGNANCY IN FIRST TRIMESTER: ICD-10-CM

## 2024-08-14 DIAGNOSIS — Z3A.28 28 WEEKS GESTATION OF PREGNANCY: ICD-10-CM

## 2024-08-14 DIAGNOSIS — Z36.89 ENCOUNTER FOR OTHER SPECIFIED ANTENATAL SCREENING: ICD-10-CM

## 2024-08-14 DIAGNOSIS — O30.041 DICHORIONIC DIAMNIOTIC TWIN PREGNANCY IN FIRST TRIMESTER: Primary | ICD-10-CM

## 2024-08-14 LAB
BASOPHILS # BLD AUTO: 0.02 K/UL (ref 0–0.2)
BASOPHILS NFR BLD AUTO: 0.3 % (ref 0–1)
BILIRUB SERPL-MCNC: NEGATIVE MG/DL
BLOOD, POC UA: NEGATIVE
DIFFERENTIAL METHOD BLD: ABNORMAL
EOSINOPHIL # BLD AUTO: 0.13 K/UL (ref 0–0.5)
EOSINOPHIL NFR BLD AUTO: 2.3 % (ref 1–4)
ERYTHROCYTE [DISTWIDTH] IN BLOOD BY AUTOMATED COUNT: 13.2 % (ref 11.5–14.5)
GLUCOSE SERPL-MCNC: 78 MG/DL (ref 74–106)
GLUCOSE UR QL STRIP: NEGATIVE
HCT VFR BLD AUTO: 26.4 % (ref 38–47)
HGB BLD-MCNC: 8.7 G/DL (ref 12–16)
IMM GRANULOCYTES # BLD AUTO: 0.02 K/UL (ref 0–0.04)
IMM GRANULOCYTES NFR BLD: 0.3 % (ref 0–0.4)
KETONES UR QL STRIP: NEGATIVE
LEUKOCYTE ESTERASE URINE, POC: NEGATIVE
LYMPHOCYTES # BLD AUTO: 1.4 K/UL (ref 1–4.8)
LYMPHOCYTES NFR BLD AUTO: 24.3 % (ref 27–41)
MCH RBC QN AUTO: 30.7 PG (ref 27–31)
MCHC RBC AUTO-ENTMCNC: 33 G/DL (ref 32–36)
MCV RBC AUTO: 93.3 FL (ref 80–96)
MONOCYTES # BLD AUTO: 0.69 K/UL (ref 0–0.8)
MONOCYTES NFR BLD AUTO: 12 % (ref 2–6)
MPC BLD CALC-MCNC: 11.8 FL (ref 9.4–12.4)
NEUTROPHILS # BLD AUTO: 3.51 K/UL (ref 1.8–7.7)
NEUTROPHILS NFR BLD AUTO: 60.8 % (ref 53–65)
NITRITE, POC UA: NEGATIVE
NRBC # BLD AUTO: 0 X10E3/UL
NRBC, AUTO (.00): 0 %
PH, POC UA: 7.5
PLATELET # BLD AUTO: 242 K/UL (ref 150–400)
PROTEIN, POC: NORMAL
RBC # BLD AUTO: 2.83 M/UL (ref 4.2–5.4)
SPECIFIC GRAVITY, POC UA: 1.02
UROBILINOGEN, POC UA: 2
WBC # BLD AUTO: 5.77 K/UL (ref 4.5–11)

## 2024-08-14 PROCEDURE — 82950 GLUCOSE TEST: CPT | Mod: ,,, | Performed by: CLINICAL MEDICAL LABORATORY

## 2024-08-14 PROCEDURE — 36415 COLL VENOUS BLD VENIPUNCTURE: CPT | Mod: ,,, | Performed by: OBSTETRICS & GYNECOLOGY

## 2024-08-14 PROCEDURE — 59426 ANTEPARTUM CARE ONLY: CPT | Mod: TH,,, | Performed by: OBSTETRICS & GYNECOLOGY

## 2024-08-14 PROCEDURE — 85025 COMPLETE CBC W/AUTO DIFF WBC: CPT | Mod: ,,, | Performed by: CLINICAL MEDICAL LABORATORY

## 2024-08-14 NOTE — PROGRESS NOTES
24 y.o. female  at 28w2d   Reports fetal movement or fluttering. Denies any vaginal bleeding, leakage of fluid, cramping, contractions, or pressure.   She complains of nothing.  Pt states she is doing well without any concerns.     Vitals  BP: 111/61  Pulse: 94  Weight: 95.4 kg (210 lb 6.4 oz)  Prenatal  Fundal Height (cm): 28 cm  Fetal Heart Rate: 150s/140s  Movement: Present  Urine Albumin/Glucose  Urine Albumin: Trace  Urine Glucose: Negative  Edema  LLE Edema: None  RLE Edema: None  Facial: None  Additional Edema?: No    Prenatal Labs:  Lab Results   Component Value Date    GROUPTRH B POS 2024    HGB 11.5 (L) 2024    HCT 33.5 (L) 2024     2024    SICKLE Negative 2024    HEPBSAG Non-Reactive 2024    [id number] Non-Reactive 2024    LABNGO Negative 2024    LABURIN Skin/Urogenital Viv Isolated, no further workup. 2024    [id number] Negative 2021       The following were addressed during this visit:    25-28 Weeks  -  Labor Signs   - Childbirth Education   - Maternity Leave paperwork   - Smoking Intervention   - Weight Gain/Diet/Exercise   - Rhogam Given   - Rooming in baby during your hospital stay       Daily fetal kick counts, bleeding, and  labor/labor precautions discussed.  Questions answered to desired level of satisfaction  Verbalized understanding to all information and instructions provided.    Total weight gain/weight loss in pregnancy: Not found.     Follow up in about 4 weeks (around 2024) for CYN, w/ US.    A: 28w2d     ICD-10-CM ICD-9-CM    1.  screening encounter  Z36.9 V28.9 Drug Screen Definitive 14, Urine      CBC Auto Differential      POCT Urinalysis      Drug Screen Definitive 14, Urine      US OB <14 Wks TransAbd & TransVag, Twin Gestation (xpd)      Glucose, 1Hr Post Prandial      CBC Auto Differential      2. 28 weeks gestation of pregnancy  Z3A.28 V22.2 Drug Screen Definitive 14, Urine       CBC Auto Differential      POCT Urinalysis      Drug Screen Definitive 14, Urine      CBC Auto Differential      3. Encounter for other specified  screening  Z36.89 V28.9 Drug Screen Definitive 14, Urine      Drug Screen Definitive 14, Urine      4. Dichorionic diamniotic twin pregnancy in first trimester  O30.041 651.03 US OB <14 Wks TransAbd & TransVag, Twin Gestation (xpd)     V91.03             Martha Barton M.D., FCOG    OB/GYN

## 2024-08-16 LAB
6-ACETYLMORPHINE, URINE (RUSH): NEGATIVE 10 NG/ML
7-AMINOCLONAZEPAM, URINE (RUSH): NEGATIVE 25 NG/ML
A-HYDROXYALPRAZOLAM, URINE (RUSH): NEGATIVE 25 NG/ML
AMPHET UR QL SCN: NEGATIVE
BENZOYLECGONINE, URINE (RUSH): NEGATIVE 100 NG/ML
BUPRENORPHINE UR QL SCN: NEGATIVE 25 NG/ML
CODEINE, URINE (RUSH): NEGATIVE 25 NG/ML
CREAT UR-MCNC: 191 MG/DL (ref 28–219)
EDDP, URINE (RUSH): NEGATIVE 25 NG/ML
FENTANYL, URINE (RUSH): NEGATIVE 2.5 NG/ML
HYDROCODONE, URINE (RUSH): NEGATIVE 25 NG/ML
HYDROMORPHONE, URINE (RUSH): NEGATIVE 25 NG/ML
METHADONE UR QL SCN: NEGATIVE 25 NG/ML
METHAMPHET UR QL SCN: NEGATIVE
MORPHINE, URINE (RUSH): NEGATIVE 25 NG/ML
NORBUPRENORPHINE, URINE (RUSH): NEGATIVE 25 NG/ML
NORDIAZEPAM, URINE (RUSH): NEGATIVE 25 NG/ML
NORFENTANYL OXALATE, URINE (RUSH): NEGATIVE 5 NG/ML
NORHYDROCODONE, URINE (RUSH): NEGATIVE 50 NG/ML
NOROXYCODONE HCL, URINE (RUSH): NEGATIVE 50 NG/ML
OXYCODONE UR QL SCN: NEGATIVE 25 NG/ML
OXYMORPHONE, URINE (RUSH): NEGATIVE 25 NG/ML
PH UR STRIP: 8 PH UNITS
SP GR UR STRIP: 1.02
TAPENTADOL, URINE (RUSH): NEGATIVE 25 NG/ML
TEMAZEPAM, URINE (RUSH): NEGATIVE 25 NG/ML
THC-COOH, URINE (RUSH): NEGATIVE 25 NG/ML
TRAMADOL, URINE (RUSH): NEGATIVE 100 NG/ML

## 2024-08-20 RX ORDER — FERROUS SULFATE 325(65) MG
325 TABLET, DELAYED RELEASE (ENTERIC COATED) ORAL
Qty: 90 TABLET | Refills: 11 | Status: SHIPPED | OUTPATIENT
Start: 2024-08-20 | End: 2025-08-20

## 2024-08-21 ENCOUNTER — TELEPHONE (OUTPATIENT)
Dept: OBSTETRICS AND GYNECOLOGY | Facility: CLINIC | Age: 24
End: 2024-08-21
Payer: MEDICAID

## 2024-08-21 NOTE — TELEPHONE ENCOUNTER
----- Message from Martha Barton MD sent at 8/20/2024 10:26 PM CDT -----  Your lab results indicated anemia. Please increase iron rich foods in diet such as dark green leafy vegetables, oatmeal, liver. A prescription for iron was sent to the pharmacy on file. Thank  you.

## 2024-09-05 NOTE — PROCEDURES
OB ultrasound Note:    BPD- 29 week 6 day  HC-28 week 6 day  AC- 28 week 4 day  FL- 29 week 6 day    CODY- 8.7 cm    Fetal heart rate:  149 bpm    Fetal position- breech  Placental location- posterior    Impression-    JEAN-CLAUDE October 28, 2024  Estimated gestational age 29 weeks 2 day  EFW 1339 g (2 lb 15 oz)  Pctl ( EFW) 48 th percentile      Biophysical Profile:    Fetal Movements- 2  Fetal breathing- 2  Fetal Tone- 2  Amniotic Fluid Volume- 2    Total - 8    Fetus B:    OB ultrasound Note:    BPD- 29 weeks 5 day  HC-28 week 4 day  AC- 28 weeks 3 day  FL- 30 week 5 day    CODY- 9.16 cm    Fetal heart rate:  151 bpm    Fetal position- breech  Placental location- posterior    Impression-    JEAN-CLAUDE October 27, 2024  Estimated gestational age 29 weeks 3 day  EFW 1370 g (3 lb 0 oz)  Pctl ( EFW) 49 th percentile      Biophysical Profile:    Fetal Movements- 2  Fetal breathing- 2  Fetal Tone- 2  Amniotic Fluid Volume- 2    Total - 8

## 2024-09-11 ENCOUNTER — ROUTINE PRENATAL (OUTPATIENT)
Dept: OBSTETRICS AND GYNECOLOGY | Facility: CLINIC | Age: 24
End: 2024-09-11
Payer: MEDICAID

## 2024-09-11 ENCOUNTER — PROCEDURE VISIT (OUTPATIENT)
Dept: OBSTETRICS AND GYNECOLOGY | Facility: CLINIC | Age: 24
End: 2024-09-11
Payer: MEDICAID

## 2024-09-11 VITALS
HEART RATE: 104 BPM | WEIGHT: 209.81 LBS | BODY MASS INDEX: 30.98 KG/M2 | DIASTOLIC BLOOD PRESSURE: 51 MMHG | SYSTOLIC BLOOD PRESSURE: 96 MMHG

## 2024-09-11 DIAGNOSIS — O30.041 DICHORIONIC DIAMNIOTIC TWIN PREGNANCY IN FIRST TRIMESTER: ICD-10-CM

## 2024-09-11 DIAGNOSIS — Z3A.32 32 WEEKS GESTATION OF PREGNANCY: ICD-10-CM

## 2024-09-11 DIAGNOSIS — Z36.9 ANTENATAL SCREENING ENCOUNTER: Primary | ICD-10-CM

## 2024-09-11 DIAGNOSIS — Z3A.32 32 WEEKS GESTATION OF PREGNANCY: Primary | ICD-10-CM

## 2024-09-11 LAB
BILIRUB SERPL-MCNC: NEGATIVE MG/DL
BLOOD, POC UA: NEGATIVE
GLUCOSE UR QL STRIP: NEGATIVE
KETONES UR QL STRIP: NEGATIVE
LEUKOCYTE ESTERASE URINE, POC: NORMAL
NITRITE, POC UA: NEGATIVE
PH, POC UA: 7
PROTEIN, POC: NEGATIVE
SPECIFIC GRAVITY, POC UA: 1.02
UROBILINOGEN, POC UA: 1

## 2024-09-11 PROCEDURE — 76819 FETAL BIOPHYS PROFIL W/O NST: CPT | Mod: ,,, | Performed by: OBSTETRICS & GYNECOLOGY

## 2024-09-11 PROCEDURE — 99499 UNLISTED E&M SERVICE: CPT | Mod: ,,, | Performed by: OBSTETRICS & GYNECOLOGY

## 2024-09-11 PROCEDURE — 59426 ANTEPARTUM CARE ONLY: CPT | Mod: TH,,, | Performed by: OBSTETRICS & GYNECOLOGY

## 2024-09-11 PROCEDURE — 76810 OB US >/= 14 WKS ADDL FETUS: CPT | Mod: ,,, | Performed by: OBSTETRICS & GYNECOLOGY

## 2024-09-11 PROCEDURE — 76805 OB US >/= 14 WKS SNGL FETUS: CPT | Mod: ,,, | Performed by: OBSTETRICS & GYNECOLOGY

## 2024-09-11 NOTE — PROGRESS NOTES
24 y.o. female  at 32w2d   Reports fetal movement or fluttering. Denies any vaginal bleeding, leakage of fluid, cramping, contractions, or pressure.   She complains of nose bleeds  Pt states she is doing well without any concerns.     Vitals  BP: (!) 96/51  Pulse: 104  Weight: 95.2 kg (209 lb 12.8 oz)  Prenatal  Fundal Height (cm): 32 cm  Fetal Heart Rate: 140s/150s  Movement: Present  Urine Albumin/Glucose  Urine Albumin: Negative  Urine Glucose: Negative  Edema  LLE Edema: None  RLE Edema: None  Facial: None  Additional Edema?: No    Prenatal Labs:  Lab Results   Component Value Date    GROUPTRH B POS 2024    HGB 8.7 (L) 2024    HCT 26.4 (L) 2024     2024    SICKLE Negative 2024    HEPBSAG Non-Reactive 2024    [id number] Non-Reactive 2024    LABNGO Negative 2024    LABURIN Skin/Urogenital Viv Isolated, no further workup. 2024    [id number] Negative 2021       The following were addressed during this visit:    29-32 Weeks  - Tdap Given   - Contraception/Tubal Consent   - Pre-registration   - Circumsision plans   - Op note review/ consent   - Birth Plan   - Pediatrician   - Fetal Kick Counts/PIH/PTL precautions   - Preeclampsia Education   - Quiet time       Daily fetal kick counts, bleeding, and  labor/labor precautions discussed.  Questions answered to desired level of satisfaction  Verbalized understanding to all information and instructions provided.    Total weight gain/weight loss in pregnancy: Not found.     Follow up in about 1 week (around 2024) for CYN, w/ US.    A: 32w2d     ICD-10-CM ICD-9-CM    1.  screening encounter  Z36.9 V28.9 POCT Urinalysis      2. 32 weeks gestation of pregnancy  Z3A.32 V22.2 POCT Urinalysis      3. Dichorionic diamniotic twin pregnancy in first trimester  O30.041 651.03      V91.03       4. Breech presentation, fetus 1 of multiple gestation  O32.1XX1 652.20             Urelaine  NILSON Barton., OG    OB/GYN           [place]

## 2024-09-13 NOTE — PROCEDURES
OB Ultrasound Note:    FetusA:  BPD- 27 week 4 day  HC - 25 weeks 1 day  Abdomina circumference- 24 week 6 day  Femur length- 260 day      Placental location-- posterior    Fetal heart rate- 169  Fetal position- vertex    Impression-    JEAN-CLAUDE October 24, 2024  Estmated gestational age 25 week day  Estimated fetal weight 812 g (1 lb 12 oz)   Estimated fetal weight percentile  41st      Fetus B:  BPD- 25 weeks 1 day  HC - 24 weeks 4 day  Abdomina circumference- 24 weeks 5 day  Femur length- 24 weeks 1 day      Placental location-- posterior    Fetal heart rate- 150  Fetal position- transverse    Impression-    JEAN-CLAUDE November 1, 2024  Estmated gestational age 24 weeks 5 day   Estimated fetal weight 702 g (1 lb 8 oz)   Percentile 42nd percentile

## 2024-09-18 ENCOUNTER — ROUTINE PRENATAL (OUTPATIENT)
Dept: OBSTETRICS AND GYNECOLOGY | Facility: CLINIC | Age: 24
End: 2024-09-18
Payer: MEDICAID

## 2024-09-18 ENCOUNTER — PROCEDURE VISIT (OUTPATIENT)
Dept: OBSTETRICS AND GYNECOLOGY | Facility: CLINIC | Age: 24
End: 2024-09-18
Payer: MEDICAID

## 2024-09-18 VITALS
SYSTOLIC BLOOD PRESSURE: 114 MMHG | HEART RATE: 90 BPM | WEIGHT: 213.81 LBS | DIASTOLIC BLOOD PRESSURE: 60 MMHG | BODY MASS INDEX: 31.57 KG/M2

## 2024-09-18 DIAGNOSIS — O30.041 DICHORIONIC DIAMNIOTIC TWIN PREGNANCY IN FIRST TRIMESTER: Primary | ICD-10-CM

## 2024-09-18 DIAGNOSIS — Z3A.33 33 WEEKS GESTATION OF PREGNANCY: ICD-10-CM

## 2024-09-18 DIAGNOSIS — Z36.9 ANTENATAL SCREENING ENCOUNTER: Primary | ICD-10-CM

## 2024-09-18 DIAGNOSIS — O30.041 DICHORIONIC DIAMNIOTIC TWIN PREGNANCY IN FIRST TRIMESTER: ICD-10-CM

## 2024-09-18 LAB
BILIRUB SERPL-MCNC: NEGATIVE MG/DL
BLOOD, POC UA: NEGATIVE
GLUCOSE UR QL STRIP: NEGATIVE
KETONES UR QL STRIP: NEGATIVE
LEUKOCYTE ESTERASE URINE, POC: NORMAL
NITRITE, POC UA: NORMAL
PH, POC UA: 7
PROTEIN, POC: NEGATIVE
SPECIFIC GRAVITY, POC UA: >=1.03
UROBILINOGEN, POC UA: 2

## 2024-09-18 PROCEDURE — 59426 ANTEPARTUM CARE ONLY: CPT | Mod: TH,,, | Performed by: OBSTETRICS & GYNECOLOGY

## 2024-09-18 PROCEDURE — 76819 FETAL BIOPHYS PROFIL W/O NST: CPT | Mod: ,,, | Performed by: OBSTETRICS & GYNECOLOGY

## 2024-09-18 PROCEDURE — 76805 OB US >/= 14 WKS SNGL FETUS: CPT | Mod: ,,, | Performed by: OBSTETRICS & GYNECOLOGY

## 2024-09-18 PROCEDURE — 99499 UNLISTED E&M SERVICE: CPT | Mod: ,,, | Performed by: OBSTETRICS & GYNECOLOGY

## 2024-09-18 PROCEDURE — 76810 OB US >/= 14 WKS ADDL FETUS: CPT | Mod: ,,, | Performed by: OBSTETRICS & GYNECOLOGY

## 2024-09-18 NOTE — PROGRESS NOTES
24 y.o. female  at 33w2d   Reports fetal movement or fluttering. Denies any vaginal bleeding, leakage of fluid, cramping, contractions, or pressure.   She complains of nothing.  Pt states she is doing well without any concerns.     Vitals  BP: 114/60  Pulse: 90  Weight: 97 kg (213 lb 12.8 oz)  Prenatal  Fundal Height (cm): 38 cm  Fetal Heart Rate: 130s/140s  Movement: Present  Urine Albumin/Glucose  Urine Albumin: Negative  Urine Glucose: Negative  Edema  LLE Edema: None  RLE Edema: None  Facial: None  Additional Edema?: No    Prenatal Labs:  Lab Results   Component Value Date    GROUPTRH B POS 2024    HGB 8.7 (L) 2024    HCT 26.4 (L) 2024     2024    SICKLE Negative 2024    HEPBSAG Non-Reactive 2024    [id number] Non-Reactive 2024    LABNGO Negative 2024    LABURIN Skin/Urogenital Viv Isolated, no further workup. 2024    [id number] Negative 2021       The following were addressed during this visit:    33-36 Weeks  - Childbirth Education/Hospital Tours   - Breastfeeding   - Group B Strep Test/HIV/RPR   - Fetal Kick Counts/PIH/PTL precautions       Daily fetal kick counts, bleeding, and  labor/labor precautions discussed.  Questions answered to desired level of satisfaction  Verbalized understanding to all information and instructions provided.    Total weight gain/weight loss in pregnancy: Not found.     Follow up in about 2 weeks (around 10/2/2024) for CYN w/ US.    A: 33w2d     ICD-10-CM ICD-9-CM    1.  screening encounter  Z36.9 V28.9 POCT Urinalysis      2. 33 weeks gestation of pregnancy  Z3A.33 V22.2 POCT Urinalysis      3. Dichorionic diamniotic twin pregnancy in first trimester  O30.041 651.03      V91.03       4. Breech presentation, fetus 1 of multiple gestation  O32.1XX1 652.20             Martha Barton M.D., FCOG    OB/GYN

## 2024-09-19 NOTE — PROCEDURES
OB ultrasound Note:    Fetus A:  BPD- 34 weeks 0 day  HC-34 week 1 day  AC- 30 week 6 day  FL- 31 weeks 5 day    CODY- 6.66 cm    Fetal heart rate:  152 bpm    Fetal position- breech  Placental location- posterior    Impression-    JEAN-CLAUDE November 1, 2024  Estimated gestational age 32 weeks 5 day  EFW 1816 g (4 lb 0 oz)  Pctl ( EFW) 29 th percentile      Biophysical Profile:    Fetal Movements- 2  Fetal breathing- 2  Fetal Tone- 2  Amniotic Fluid Volume- 2    Total - 8    Fetus B:    OB ultrasound Note:    BPD- 34 weeks 0 day  HC-35 weeks 3 day  AC- 31 weeks 1 day  FL- 31 weeks 0 day    CODY- 10.33 cm    Fetal heart rate:  143 bpm    Fetal position- breech  Placental location- fundal    Impression-    JEAN-CLAUDE October 31, 2024  Estimated gestational age 32 week 6 day  EFW 1833 g (4 lb 0 oz)  Pctl ( EFW) 28 th percentile      Biophysical Profile:    Fetal Movements- 2  Fetal breathing- 2  Fetal Tone- 2  Amniotic Fluid Volume- 2    Total - 8

## 2024-09-26 NOTE — PROCEDURES
OB ultrasound Note:    Fetus A:    BPD- 34 weeks 0 day  HC-33 weeks 2 day  AC- 33 weeks 5 day  FL- 35 weeks 4 day    CODY- 8.68 cm    Fetal heart rate:  150 bpm    Fetal position- breech  Placental location- posterior    Impression-    JEAN-CLAUDE October 29, 2024  Estimated gestational age 34 weeks 1 day  EFW 2377 g (5 lb 3 oz)  Pctl ( EFW) 46 th percentile      Biophysical Profile:    Fetal Movements- 2  Fetal breathing- 2  Fetal Tone- 2  Amniotic Fluid Volume- 2    Total - 8    Fetus B:  OB ultrasound Note:    BPD- 33 weeks 1 day  HC-34 week 4 day  AC- 31 week 6 day  FL- 34 weeks 3 day    CODY- 8.16 cm    Fetal heart rate:  157 bpm    Fetal position- transverse  Placental location- posterior    Impression-    JEAN-CLAUDE November 2, 2024  Estimated gestational age 33 weeks 4 day  EFW 2104 g (4 lb 10 oz)  Pctl ( EFW) 35 th percentile      Biophysical Profile:    Fetal Movements- 2  Fetal breathing- 2  Fetal Tone- 2  Amniotic Fluid Volume- 2    Total - 8

## 2024-10-02 ENCOUNTER — ROUTINE PRENATAL (OUTPATIENT)
Dept: OBSTETRICS AND GYNECOLOGY | Facility: CLINIC | Age: 24
End: 2024-10-02
Payer: MEDICAID

## 2024-10-02 ENCOUNTER — ANESTHESIA EVENT (OUTPATIENT)
Dept: OBSTETRICS AND GYNECOLOGY | Facility: HOSPITAL | Age: 24
End: 2024-10-02
Payer: MEDICAID

## 2024-10-02 ENCOUNTER — ANESTHESIA (OUTPATIENT)
Dept: OBSTETRICS AND GYNECOLOGY | Facility: HOSPITAL | Age: 24
End: 2024-10-02
Payer: MEDICAID

## 2024-10-02 ENCOUNTER — HOSPITAL ENCOUNTER (INPATIENT)
Facility: HOSPITAL | Age: 24
LOS: 3 days | Discharge: HOME | End: 2024-10-05
Attending: OBSTETRICS & GYNECOLOGY | Admitting: OBSTETRICS & GYNECOLOGY
Payer: MEDICAID

## 2024-10-02 VITALS
WEIGHT: 212.81 LBS | DIASTOLIC BLOOD PRESSURE: 62 MMHG | HEART RATE: 84 BPM | SYSTOLIC BLOOD PRESSURE: 109 MMHG | BODY MASS INDEX: 31.43 KG/M2

## 2024-10-02 DIAGNOSIS — Z11.3 SCREEN FOR STD (SEXUALLY TRANSMITTED DISEASE): ICD-10-CM

## 2024-10-02 DIAGNOSIS — O60.00: ICD-10-CM

## 2024-10-02 DIAGNOSIS — Z3A.35 35 WEEKS GESTATION OF PREGNANCY: ICD-10-CM

## 2024-10-02 DIAGNOSIS — O30.041 DICHORIONIC DIAMNIOTIC TWIN PREGNANCY IN FIRST TRIMESTER: ICD-10-CM

## 2024-10-02 DIAGNOSIS — Z36.9 ANTENATAL SCREENING ENCOUNTER: ICD-10-CM

## 2024-10-02 DIAGNOSIS — Z34.90 PREGNANCY: Primary | ICD-10-CM

## 2024-10-02 DIAGNOSIS — Z36.85 ENCOUNTER FOR ANTENATAL SCREENING FOR STREPTOCOCCUS B: ICD-10-CM

## 2024-10-02 DIAGNOSIS — Z36.89 ENCOUNTER FOR OTHER SPECIFIED ANTENATAL SCREENING: ICD-10-CM

## 2024-10-02 DIAGNOSIS — Z98.891 STATUS POST CESAREAN DELIVERY: ICD-10-CM

## 2024-10-02 DIAGNOSIS — Z72.51 HIGH RISK SEXUAL BEHAVIOR, UNSPECIFIED TYPE: ICD-10-CM

## 2024-10-02 DIAGNOSIS — Z36.9 ANTENATAL SCREENING ENCOUNTER: Primary | ICD-10-CM

## 2024-10-02 LAB
BASOPHILS # BLD AUTO: 0.02 K/UL (ref 0–0.2)
BASOPHILS # BLD AUTO: 0.02 K/UL (ref 0–0.2)
BASOPHILS NFR BLD AUTO: 0.3 % (ref 0–1)
BASOPHILS NFR BLD AUTO: 0.3 % (ref 0–1)
BILIRUB SERPL-MCNC: NEGATIVE MG/DL
BILIRUB UR QL STRIP: NEGATIVE
BLOOD, POC UA: NEGATIVE
CANDIDA SPECIES: NEGATIVE
CLARITY UR: CLEAR
COLOR UR: ABNORMAL
DIFFERENTIAL METHOD BLD: ABNORMAL
DIFFERENTIAL METHOD BLD: ABNORMAL
EOSINOPHIL # BLD AUTO: 0.01 K/UL (ref 0–0.5)
EOSINOPHIL # BLD AUTO: 0.05 K/UL (ref 0–0.5)
EOSINOPHIL NFR BLD AUTO: 0.2 % (ref 1–4)
EOSINOPHIL NFR BLD AUTO: 0.8 % (ref 1–4)
ERYTHROCYTE [DISTWIDTH] IN BLOOD BY AUTOMATED COUNT: 13.7 % (ref 11.5–14.5)
ERYTHROCYTE [DISTWIDTH] IN BLOOD BY AUTOMATED COUNT: 14 % (ref 11.5–14.5)
GARDNERELLA: NEGATIVE
GLUCOSE UR QL STRIP: NEGATIVE
GLUCOSE UR STRIP-MCNC: NORMAL MG/DL
HCT VFR BLD AUTO: 28.8 % (ref 38–47)
HCT VFR BLD AUTO: 28.9 % (ref 38–47)
HGB BLD-MCNC: 9.4 G/DL (ref 12–16)
HGB BLD-MCNC: 9.5 G/DL (ref 12–16)
HIV 1+O+2 AB SERPL QL: NORMAL
IMM GRANULOCYTES # BLD AUTO: 0.03 K/UL (ref 0–0.04)
IMM GRANULOCYTES # BLD AUTO: 0.03 K/UL (ref 0–0.04)
IMM GRANULOCYTES NFR BLD: 0.5 % (ref 0–0.4)
IMM GRANULOCYTES NFR BLD: 0.5 % (ref 0–0.4)
INDIRECT COOMBS: NORMAL
KETONES UR QL STRIP: NEGATIVE
KETONES UR STRIP-SCNC: NEGATIVE MG/DL
LEUKOCYTE ESTERASE UR QL STRIP: NEGATIVE
LEUKOCYTE ESTERASE URINE, POC: NORMAL
LYMPHOCYTES # BLD AUTO: 1.65 K/UL (ref 1–4.8)
LYMPHOCYTES # BLD AUTO: 1.7 K/UL (ref 1–4.8)
LYMPHOCYTES NFR BLD AUTO: 25.5 % (ref 27–41)
LYMPHOCYTES NFR BLD AUTO: 26.6 % (ref 27–41)
MCH RBC QN AUTO: 29.3 PG (ref 27–31)
MCH RBC QN AUTO: 29.4 PG (ref 27–31)
MCHC RBC AUTO-ENTMCNC: 32.5 G/DL (ref 32–36)
MCHC RBC AUTO-ENTMCNC: 33 G/DL (ref 32–36)
MCV RBC AUTO: 88.9 FL (ref 80–96)
MCV RBC AUTO: 90.3 FL (ref 80–96)
MONOCYTES # BLD AUTO: 0.75 K/UL (ref 0–0.8)
MONOCYTES # BLD AUTO: 0.78 K/UL (ref 0–0.8)
MONOCYTES NFR BLD AUTO: 11.7 % (ref 2–6)
MONOCYTES NFR BLD AUTO: 12.1 % (ref 2–6)
MPC BLD CALC-MCNC: 11.8 FL (ref 9.4–12.4)
MPC BLD CALC-MCNC: 12.3 FL (ref 9.4–12.4)
NEUTROPHILS # BLD AUTO: 3.84 K/UL (ref 1.8–7.7)
NEUTROPHILS # BLD AUTO: 3.98 K/UL (ref 1.8–7.7)
NEUTROPHILS NFR BLD AUTO: 60.1 % (ref 53–65)
NEUTROPHILS NFR BLD AUTO: 61.4 % (ref 53–65)
NITRITE UR QL STRIP: NEGATIVE
NITRITE, POC UA: NEGATIVE
NRBC # BLD AUTO: 0 X10E3/UL
NRBC # BLD AUTO: 0 X10E3/UL
NRBC, AUTO (.00): 0 %
NRBC, AUTO (.00): 0 %
PH UR STRIP: 6.5 PH UNITS
PH, POC UA: 7
PLATELET # BLD AUTO: 249 K/UL (ref 150–400)
PLATELET # BLD AUTO: 252 K/UL (ref 150–400)
PROT UR QL STRIP: 10
PROTEIN, POC: NEGATIVE
RBC # BLD AUTO: 3.2 M/UL (ref 4.2–5.4)
RBC # BLD AUTO: 3.24 M/UL (ref 4.2–5.4)
RBC # UR STRIP: NEGATIVE /UL
RH BLD: NORMAL
SP GR UR STRIP: 1.02
SPECIFIC GRAVITY, POC UA: >=1.03
SPECIMEN OUTDATE: NORMAL
SYPHILIS AB INTERPRETATION: NORMAL
TRICHOMONAS: NEGATIVE
UROBILINOGEN UR STRIP-ACNC: NORMAL MG/DL
UROBILINOGEN, POC UA: 0.2
WBC # BLD AUTO: 6.39 K/UL (ref 4.5–11)
WBC # BLD AUTO: 6.47 K/UL (ref 4.5–11)

## 2024-10-02 PROCEDURE — 85025 COMPLETE CBC W/AUTO DIFF WBC: CPT | Mod: ,,, | Performed by: CLINICAL MEDICAL LABORATORY

## 2024-10-02 PROCEDURE — 59515 CESAREAN DELIVERY: CPT | Mod: 22,TH,, | Performed by: OBSTETRICS & GYNECOLOGY

## 2024-10-02 PROCEDURE — 25000003 PHARM REV CODE 250: Performed by: OBSTETRICS & GYNECOLOGY

## 2024-10-02 PROCEDURE — 59426 ANTEPARTUM CARE ONLY: CPT | Mod: TH,,, | Performed by: OBSTETRICS & GYNECOLOGY

## 2024-10-02 PROCEDURE — 71000033 HC RECOVERY, INTIAL HOUR: Performed by: OBSTETRICS & GYNECOLOGY

## 2024-10-02 PROCEDURE — 86901 BLOOD TYPING SEROLOGIC RH(D): CPT | Performed by: OBSTETRICS & GYNECOLOGY

## 2024-10-02 PROCEDURE — 63600175 PHARM REV CODE 636 W HCPCS

## 2024-10-02 PROCEDURE — 63600175 PHARM REV CODE 636 W HCPCS: Performed by: OBSTETRICS & GYNECOLOGY

## 2024-10-02 PROCEDURE — 37000008 HC ANESTHESIA 1ST 15 MINUTES: Performed by: OBSTETRICS & GYNECOLOGY

## 2024-10-02 PROCEDURE — 63600175 PHARM REV CODE 636 W HCPCS: Performed by: NURSE ANESTHETIST, CERTIFIED REGISTERED

## 2024-10-02 PROCEDURE — 86780 TREPONEMA PALLIDUM: CPT | Mod: ,,, | Performed by: CLINICAL MEDICAL LABORATORY

## 2024-10-02 PROCEDURE — 36004725 HC OB OR TIME LEV III - EA ADD 15 MIN: Performed by: OBSTETRICS & GYNECOLOGY

## 2024-10-02 PROCEDURE — 36415 COLL VENOUS BLD VENIPUNCTURE: CPT | Mod: ,,, | Performed by: OBSTETRICS & GYNECOLOGY

## 2024-10-02 PROCEDURE — 27201423 OPTIME MED/SURG SUP & DEVICES STERILE SUPPLY: Performed by: OBSTETRICS & GYNECOLOGY

## 2024-10-02 PROCEDURE — 11000001 HC ACUTE MED/SURG PRIVATE ROOM

## 2024-10-02 PROCEDURE — 36415 COLL VENOUS BLD VENIPUNCTURE: CPT | Performed by: OBSTETRICS & GYNECOLOGY

## 2024-10-02 PROCEDURE — 81003 URINALYSIS AUTO W/O SCOPE: CPT | Performed by: OBSTETRICS & GYNECOLOGY

## 2024-10-02 PROCEDURE — 85025 COMPLETE CBC W/AUTO DIFF WBC: CPT | Performed by: OBSTETRICS & GYNECOLOGY

## 2024-10-02 PROCEDURE — 36004724 HC OB OR TIME LEV III - 1ST 15 MIN: Performed by: OBSTETRICS & GYNECOLOGY

## 2024-10-02 PROCEDURE — 87389 HIV-1 AG W/HIV-1&-2 AB AG IA: CPT | Performed by: OBSTETRICS & GYNECOLOGY

## 2024-10-02 PROCEDURE — 87510 GARDNER VAG DNA DIR PROBE: CPT | Mod: ,,, | Performed by: CLINICAL MEDICAL LABORATORY

## 2024-10-02 PROCEDURE — 87480 CANDIDA DNA DIR PROBE: CPT | Mod: ,,, | Performed by: CLINICAL MEDICAL LABORATORY

## 2024-10-02 PROCEDURE — 63600175 PHARM REV CODE 636 W HCPCS: Mod: JZ,JG | Performed by: ANESTHESIOLOGY

## 2024-10-02 PROCEDURE — 87653 STREP B DNA AMP PROBE: CPT | Mod: ,,, | Performed by: CLINICAL MEDICAL LABORATORY

## 2024-10-02 PROCEDURE — 87591 N.GONORRHOEAE DNA AMP PROB: CPT | Mod: ,,, | Performed by: CLINICAL MEDICAL LABORATORY

## 2024-10-02 PROCEDURE — 37000009 HC ANESTHESIA EA ADD 15 MINS: Performed by: OBSTETRICS & GYNECOLOGY

## 2024-10-02 PROCEDURE — 71000039 HC RECOVERY, EACH ADD'L HOUR: Performed by: OBSTETRICS & GYNECOLOGY

## 2024-10-02 PROCEDURE — 51702 INSERT TEMP BLADDER CATH: CPT

## 2024-10-02 PROCEDURE — 80307 DRUG TEST PRSMV CHEM ANLYZR: CPT | Mod: ,,, | Performed by: CLINICAL MEDICAL LABORATORY

## 2024-10-02 PROCEDURE — 86900 BLOOD TYPING SEROLOGIC ABO: CPT | Performed by: OBSTETRICS & GYNECOLOGY

## 2024-10-02 PROCEDURE — 87491 CHLMYD TRACH DNA AMP PROBE: CPT | Mod: ,,, | Performed by: CLINICAL MEDICAL LABORATORY

## 2024-10-02 PROCEDURE — 86850 RBC ANTIBODY SCREEN: CPT | Performed by: OBSTETRICS & GYNECOLOGY

## 2024-10-02 PROCEDURE — 87660 TRICHOMONAS VAGIN DIR PROBE: CPT | Mod: ,,, | Performed by: CLINICAL MEDICAL LABORATORY

## 2024-10-02 RX ORDER — ONDANSETRON 4 MG/1
8 TABLET, ORALLY DISINTEGRATING ORAL EVERY 8 HOURS PRN
Status: DISCONTINUED | OUTPATIENT
Start: 2024-10-02 | End: 2024-10-05 | Stop reason: HOSPADM

## 2024-10-02 RX ORDER — SODIUM CITRATE AND CITRIC ACID MONOHYDRATE 334; 500 MG/5ML; MG/5ML
30 SOLUTION ORAL
Status: DISCONTINUED | OUTPATIENT
Start: 2024-10-02 | End: 2024-10-05 | Stop reason: HOSPADM

## 2024-10-02 RX ORDER — OXYCODONE AND ACETAMINOPHEN 10; 325 MG/1; MG/1
1 TABLET ORAL EVERY 4 HOURS PRN
Status: DISCONTINUED | OUTPATIENT
Start: 2024-10-02 | End: 2024-10-05 | Stop reason: HOSPADM

## 2024-10-02 RX ORDER — KETOROLAC TROMETHAMINE 30 MG/ML
30 INJECTION, SOLUTION INTRAMUSCULAR; INTRAVENOUS EVERY 6 HOURS
Status: COMPLETED | OUTPATIENT
Start: 2024-10-02 | End: 2024-10-03

## 2024-10-02 RX ORDER — OXYTOCIN-SODIUM CHLORIDE 0.9% IV SOLN 30 UNIT/500ML 30-0.9/5 UT/ML-%
95 SOLUTION INTRAVENOUS CONTINUOUS PRN
Status: DISCONTINUED | OUTPATIENT
Start: 2024-10-02 | End: 2024-10-05 | Stop reason: HOSPADM

## 2024-10-02 RX ORDER — SODIUM CHLORIDE, SODIUM LACTATE, POTASSIUM CHLORIDE, CALCIUM CHLORIDE 600; 310; 30; 20 MG/100ML; MG/100ML; MG/100ML; MG/100ML
INJECTION, SOLUTION INTRAVENOUS CONTINUOUS
Status: DISCONTINUED | OUTPATIENT
Start: 2024-10-02 | End: 2024-10-05 | Stop reason: HOSPADM

## 2024-10-02 RX ORDER — OXYTOCIN-SODIUM CHLORIDE 0.9% IV SOLN 30 UNIT/500ML 30-0.9/5 UT/ML-%
10 SOLUTION INTRAVENOUS ONCE AS NEEDED
Status: DISCONTINUED | OUTPATIENT
Start: 2024-10-02 | End: 2024-10-05 | Stop reason: HOSPADM

## 2024-10-02 RX ORDER — METHYLERGONOVINE MALEATE 0.2 MG/ML
200 INJECTION INTRAVENOUS
Status: DISCONTINUED | OUTPATIENT
Start: 2024-10-02 | End: 2024-10-05 | Stop reason: HOSPADM

## 2024-10-02 RX ORDER — MUPIROCIN 20 MG/G
OINTMENT TOPICAL 2 TIMES DAILY
Status: DISCONTINUED | OUTPATIENT
Start: 2024-10-02 | End: 2024-10-05 | Stop reason: HOSPADM

## 2024-10-02 RX ORDER — OXYTOCIN-SODIUM CHLORIDE 0.9% IV SOLN 30 UNIT/500ML 30-0.9/5 UT/ML-%
10 SOLUTION INTRAVENOUS ONCE AS NEEDED
Status: CANCELLED | OUTPATIENT
Start: 2024-10-02 | End: 2036-02-29

## 2024-10-02 RX ORDER — METHYLERGONOVINE MALEATE 0.2 MG/ML
200 INJECTION INTRAVENOUS
Status: CANCELLED | OUTPATIENT
Start: 2024-10-02

## 2024-10-02 RX ORDER — METHYLERGONOVINE MALEATE 0.2 MG/ML
200 INJECTION INTRAVENOUS ONCE AS NEEDED
Status: DISCONTINUED | OUTPATIENT
Start: 2024-10-02 | End: 2024-10-05 | Stop reason: HOSPADM

## 2024-10-02 RX ORDER — OXYTOCIN 10 [USP'U]/ML
INJECTION, SOLUTION INTRAMUSCULAR; INTRAVENOUS
Status: DISCONTINUED | OUTPATIENT
Start: 2024-10-02 | End: 2024-10-02

## 2024-10-02 RX ORDER — SODIUM CHLORIDE, SODIUM LACTATE, POTASSIUM CHLORIDE, CALCIUM CHLORIDE 600; 310; 30; 20 MG/100ML; MG/100ML; MG/100ML; MG/100ML
INJECTION, SOLUTION INTRAVENOUS CONTINUOUS
Status: CANCELLED | OUTPATIENT
Start: 2024-10-02

## 2024-10-02 RX ORDER — SIMETHICONE 80 MG
1 TABLET,CHEWABLE ORAL EVERY 6 HOURS PRN
Status: DISCONTINUED | OUTPATIENT
Start: 2024-10-02 | End: 2024-10-05 | Stop reason: HOSPADM

## 2024-10-02 RX ORDER — AMOXICILLIN 250 MG
1 CAPSULE ORAL NIGHTLY PRN
Status: DISCONTINUED | OUTPATIENT
Start: 2024-10-02 | End: 2024-10-05 | Stop reason: HOSPADM

## 2024-10-02 RX ORDER — DIPHENHYDRAMINE HCL 25 MG
25 CAPSULE ORAL EVERY 4 HOURS PRN
Status: DISCONTINUED | OUTPATIENT
Start: 2024-10-02 | End: 2024-10-05 | Stop reason: HOSPADM

## 2024-10-02 RX ORDER — DEXAMETHASONE SODIUM PHOSPHATE 10 MG/ML
INJECTION INTRAMUSCULAR; INTRAVENOUS
Status: DISCONTINUED | OUTPATIENT
Start: 2024-10-02 | End: 2024-10-02

## 2024-10-02 RX ORDER — KETOROLAC TROMETHAMINE 30 MG/ML
INJECTION, SOLUTION INTRAMUSCULAR; INTRAVENOUS
Status: DISCONTINUED | OUTPATIENT
Start: 2024-10-02 | End: 2024-10-02

## 2024-10-02 RX ORDER — CARBOPROST TROMETHAMINE 250 UG/ML
250 INJECTION, SOLUTION INTRAMUSCULAR
Status: DISCONTINUED | OUTPATIENT
Start: 2024-10-02 | End: 2024-10-05 | Stop reason: HOSPADM

## 2024-10-02 RX ORDER — DIPHENOXYLATE HYDROCHLORIDE AND ATROPINE SULFATE 2.5; .025 MG/1; MG/1
2 TABLET ORAL EVERY 6 HOURS PRN
Status: DISCONTINUED | OUTPATIENT
Start: 2024-10-02 | End: 2024-10-05 | Stop reason: HOSPADM

## 2024-10-02 RX ORDER — MUPIROCIN 20 MG/G
OINTMENT TOPICAL
Status: CANCELLED | OUTPATIENT
Start: 2024-10-02

## 2024-10-02 RX ORDER — DOCUSATE SODIUM 100 MG/1
200 CAPSULE, LIQUID FILLED ORAL 2 TIMES DAILY
Status: DISCONTINUED | OUTPATIENT
Start: 2024-10-02 | End: 2024-10-05 | Stop reason: HOSPADM

## 2024-10-02 RX ORDER — CARBOPROST TROMETHAMINE 250 UG/ML
250 INJECTION, SOLUTION INTRAMUSCULAR
Status: CANCELLED | OUTPATIENT
Start: 2024-10-02

## 2024-10-02 RX ORDER — OXYTOCIN-SODIUM CHLORIDE 0.9% IV SOLN 30 UNIT/500ML 30-0.9/5 UT/ML-%
95 SOLUTION INTRAVENOUS ONCE AS NEEDED
Status: DISCONTINUED | OUTPATIENT
Start: 2024-10-02 | End: 2024-10-05 | Stop reason: HOSPADM

## 2024-10-02 RX ORDER — SODIUM CITRATE AND CITRIC ACID MONOHYDRATE 334; 500 MG/5ML; MG/5ML
30 SOLUTION ORAL
Status: CANCELLED | OUTPATIENT
Start: 2024-10-02

## 2024-10-02 RX ORDER — ADHESIVE BANDAGE
30 BANDAGE TOPICAL 2 TIMES DAILY PRN
Status: DISCONTINUED | OUTPATIENT
Start: 2024-10-03 | End: 2024-10-05 | Stop reason: HOSPADM

## 2024-10-02 RX ORDER — MORPHINE SULFATE 1 MG/ML
INJECTION, SOLUTION EPIDURAL; INTRATHECAL; INTRAVENOUS
Status: DISCONTINUED | OUTPATIENT
Start: 2024-10-02 | End: 2024-10-02

## 2024-10-02 RX ORDER — PHENYLEPHRINE HYDROCHLORIDE 10 MG/ML
INJECTION INTRAVENOUS
Status: DISCONTINUED | OUTPATIENT
Start: 2024-10-02 | End: 2024-10-02

## 2024-10-02 RX ORDER — OXYTOCIN 10 [USP'U]/ML
10 INJECTION, SOLUTION INTRAMUSCULAR; INTRAVENOUS ONCE AS NEEDED
Status: DISCONTINUED | OUTPATIENT
Start: 2024-10-02 | End: 2024-10-05 | Stop reason: HOSPADM

## 2024-10-02 RX ORDER — MUPIROCIN 20 MG/G
OINTMENT TOPICAL
Status: DISCONTINUED | OUTPATIENT
Start: 2024-10-02 | End: 2024-10-05 | Stop reason: HOSPADM

## 2024-10-02 RX ORDER — BUPIVACAINE HYDROCHLORIDE 7.5 MG/ML
INJECTION, SOLUTION EPIDURAL; RETROBULBAR
Status: DISCONTINUED | OUTPATIENT
Start: 2024-10-02 | End: 2024-10-02

## 2024-10-02 RX ORDER — IBUPROFEN 800 MG/1
800 TABLET ORAL EVERY 8 HOURS
Status: DISCONTINUED | OUTPATIENT
Start: 2024-10-03 | End: 2024-10-05 | Stop reason: HOSPADM

## 2024-10-02 RX ORDER — FAMOTIDINE 10 MG/ML
20 INJECTION INTRAVENOUS
Status: DISCONTINUED | OUTPATIENT
Start: 2024-10-02 | End: 2024-10-05 | Stop reason: HOSPADM

## 2024-10-02 RX ORDER — TRANEXAMIC ACID 10 MG/ML
1000 INJECTION, SOLUTION INTRAVENOUS EVERY 30 MIN PRN
Status: DISCONTINUED | OUTPATIENT
Start: 2024-10-02 | End: 2024-10-05 | Stop reason: HOSPADM

## 2024-10-02 RX ORDER — ONDANSETRON HYDROCHLORIDE 2 MG/ML
INJECTION, SOLUTION INTRAVENOUS
Status: DISCONTINUED | OUTPATIENT
Start: 2024-10-02 | End: 2024-10-02

## 2024-10-02 RX ORDER — OXYCODONE AND ACETAMINOPHEN 5; 325 MG/1; MG/1
1 TABLET ORAL EVERY 4 HOURS PRN
Status: DISCONTINUED | OUTPATIENT
Start: 2024-10-02 | End: 2024-10-05 | Stop reason: HOSPADM

## 2024-10-02 RX ORDER — EPINEPHRINE 1 MG/ML
INJECTION, SOLUTION, CONCENTRATE INTRAVENOUS
Status: DISCONTINUED | OUTPATIENT
Start: 2024-10-02 | End: 2024-10-02

## 2024-10-02 RX ORDER — BISACODYL 10 MG/1
10 SUPPOSITORY RECTAL ONCE AS NEEDED
Status: DISCONTINUED | OUTPATIENT
Start: 2024-10-02 | End: 2024-10-05 | Stop reason: HOSPADM

## 2024-10-02 RX ORDER — PRENATAL WITH FERROUS FUM AND FOLIC ACID 3080; 920; 120; 400; 22; 1.84; 3; 20; 10; 1; 12; 200; 27; 25; 2 [IU]/1; [IU]/1; MG/1; [IU]/1; MG/1; MG/1; MG/1; MG/1; MG/1; MG/1; UG/1; MG/1; MG/1; MG/1; MG/1
1 TABLET ORAL DAILY
Status: DISCONTINUED | OUTPATIENT
Start: 2024-10-02 | End: 2024-10-05 | Stop reason: HOSPADM

## 2024-10-02 RX ORDER — FAMOTIDINE 10 MG/ML
20 INJECTION INTRAVENOUS
Status: CANCELLED | OUTPATIENT
Start: 2024-10-02

## 2024-10-02 RX ADMIN — DIPHENHYDRAMINE HYDROCHLORIDE 25 MG: 25 CAPSULE ORAL at 07:10

## 2024-10-02 RX ADMIN — ONDANSETRON 4 MG: 2 INJECTION INTRAMUSCULAR; INTRAVENOUS at 01:10

## 2024-10-02 RX ADMIN — ONDANSETRON 4 MG: 2 INJECTION INTRAMUSCULAR; INTRAVENOUS at 12:10

## 2024-10-02 RX ADMIN — DOCUSATE SODIUM 200 MG: 100 CAPSULE, LIQUID FILLED ORAL at 09:10

## 2024-10-02 RX ADMIN — SODIUM CHLORIDE, POTASSIUM CHLORIDE, SODIUM LACTATE AND CALCIUM CHLORIDE: 600; 310; 30; 20 INJECTION, SOLUTION INTRAVENOUS at 05:10

## 2024-10-02 RX ADMIN — KETOROLAC TROMETHAMINE 30 MG: 30 INJECTION, SOLUTION INTRAMUSCULAR at 01:10

## 2024-10-02 RX ADMIN — OXYTOCIN 10 UNITS: 10 INJECTION, SOLUTION INTRAMUSCULAR; INTRAVENOUS at 01:10

## 2024-10-02 RX ADMIN — CEFAZOLIN 2 G: 2 INJECTION, POWDER, FOR SOLUTION INTRAMUSCULAR; INTRAVENOUS at 12:10

## 2024-10-02 RX ADMIN — KETOROLAC TROMETHAMINE 30 MG: 30 INJECTION, SOLUTION INTRAMUSCULAR at 09:10

## 2024-10-02 RX ADMIN — Medication 95 MILLI-UNITS/MIN: at 01:10

## 2024-10-02 RX ADMIN — PHENYLEPHRINE HYDROCHLORIDE 200 MCG: 10 INJECTION INTRAVENOUS at 01:10

## 2024-10-02 RX ADMIN — MUPIROCIN 1 G: 20 OINTMENT TOPICAL at 09:10

## 2024-10-02 RX ADMIN — EPINEPHRINE 0.1 MCG: 1 INJECTION, SOLUTION, CONCENTRATE INTRAVENOUS at 12:10

## 2024-10-02 RX ADMIN — DEXAMETHASONE SODIUM PHOSPHATE 8 MG: 10 INJECTION INTRAMUSCULAR; INTRAVENOUS at 01:10

## 2024-10-02 RX ADMIN — SODIUM CHLORIDE, POTASSIUM CHLORIDE, SODIUM LACTATE AND CALCIUM CHLORIDE 1000 ML: 600; 310; 30; 20 INJECTION, SOLUTION INTRAVENOUS at 11:10

## 2024-10-02 RX ADMIN — SODIUM CITRATE AND CITRIC ACID MONOHYDRATE 30 ML: 500; 334 SOLUTION ORAL at 12:10

## 2024-10-02 RX ADMIN — MORPHINE SULFATE 0.25 MG: 1 INJECTION, SOLUTION EPIDURAL; INTRATHECAL; INTRAVENOUS at 01:10

## 2024-10-02 RX ADMIN — BUPIVACAINE HYDROCHLORIDE 1.25 ML: 7.5 INJECTION, SOLUTION EPIDURAL; RETROBULBAR at 12:10

## 2024-10-02 RX ADMIN — FAMOTIDINE 20 MG: 10 INJECTION, SOLUTION INTRAVENOUS at 12:10

## 2024-10-02 NOTE — H&P
[facility] -  Labor and Delivery  Obstetrics  History & Physical    Patient Name: Connie Cook  MRN: [id number]  Admission Date: 10/2/2024  Primary Care Provider: Lavern Rapp FNP-C    Subjective:     Principal Problem: labor in third trimester with  delivery    History of Present Illness:  This is a 24 year old  100 admitted at 35 week 2 day for primary  section secondary to advanced cervical dilation with twin gestation with presenting twin a in the stella breech position.  Patient was seen in the office and found to be dilated to 4 cm.  Both babies were in the breech presentation.  She was therefore sent to labor and delivery for primary  section.  Prenatal care with Dr. Carlton complicated by dichorionic diamniotic twin gestation as well as breech presentation.  Patient's blood type B positive.  Her GBS was unknown.    Obstetric HPI:  Patient reports Frequency: Every 3-4 minutes contractions, active fetal movement, No vaginal bleeding , No loss of fluid     This pregnancy has been complicated by Di-Di twins    OB History    Para Term  AB Living   2 1 0 1 0 0   SAB IAB Ectopic Multiple Live Births   0 0 0 0 0      # Outcome Date GA Lbr Shaw/2nd Weight Sex Type Anes PTL Lv   2 Current            1  20 20w0d  0.454 kg (1 lb) F Vag-Spont   FD      Obstetric Comments   1 miscarriage-Dec 2020  8 mo gestation/ vaginally delivered, early rupture of membranes     Past Medical History:   Diagnosis Date    Allergy     Asthma     Screening examination for STD (sexually transmitted disease) 2023     Past Surgical History:   Procedure Laterality Date    COLPOSCOPY, WITH BIOPSY OF CERVIX         PTA Medications   Medication Sig    aspirin (ECOTRIN) 81 MG EC tablet Take 1 tablet (81 mg total) by mouth once daily. (Patient not taking: Reported on 10/2/2024)    ferrous sulfate 325 (65 FE) MG EC tablet Take 1 tablet (325 mg total) by mouth 3  (three) times daily with meals.    ondansetron (ZOFRAN) 4 MG tablet Take 1 tablet (4 mg total) by mouth every 6 (six) hours as needed for Nausea. (Patient not taking: Reported on 10/2/2024)    PNV,calcium 72-iron-folic acid (PRENATAL VITAMIN PLUS LOW IRON) 27 mg iron- 1 mg Tab Take 1 tablet (1 each total) by mouth once daily.    promethazine (PHENERGAN) 25 MG tablet Take 1 tablet (25 mg total) by mouth every 6 (six) hours as needed for Nausea. (Patient not taking: Reported on 10/2/2024)       Review of patient's allergies indicates:   Allergen Reactions    Strawberries [strawberry] Swelling     itching        Family History       Problem Relation (Age of Onset)    Diabetes Father, Maternal Grandmother, Maternal Aunt    No Known Problems Mother, Sister, Brother, Maternal Uncle, Paternal Aunt, Paternal Grandmother, Paternal Grandfather    Stroke Maternal Grandfather          Tobacco Use    Smoking status: Never     Passive exposure: Never    Smokeless tobacco: Never   Substance and Sexual Activity    Alcohol use: Not Currently     Comment: occassional    Drug use: Not Currently     Types: Marijuana     Comment: occassional    Sexual activity: Yes     Partners: Male     Review of Systems   Constitutional:  Negative for activity change, appetite change, fatigue and unexpected weight change.   HENT: Negative.     Respiratory:  Negative for cough, shortness of breath and wheezing.    Cardiovascular:  Negative for chest pain, palpitations and leg swelling.   Gastrointestinal:  Negative for abdominal pain, bloating, blood in stool, constipation, diarrhea, nausea, vomiting and reflux.   Genitourinary:  Negative for bladder incontinence, decreased libido, dysmenorrhea, dyspareunia, dysuria, flank pain, frequency, menorrhagia, menstrual problem, pelvic pain, urgency, vaginal bleeding, vaginal discharge, postcoital bleeding and vaginal odor.   Musculoskeletal:  Negative for back pain.   Integumentary:  Negative for rash, acne,  hair changes, mole/lesion, breast mass, nipple discharge, breast skin changes and breast tenderness.   Neurological:  Negative for headaches.   Psychiatric/Behavioral:  Negative for depression and sleep disturbance. The patient is not nervous/anxious.    Breast: Negative for asymmetry, breast self exam, lump, mass, nipple discharge, skin changes and tenderness     Objective:     Vital Signs (Most Recent):  Temp: (!) 95.9 °F (35.5 °C) (10/02/24 1346)  Pulse: 72 (10/02/24 1628)  Resp: 17 (10/02/24 1340)  BP: 110/60 (10/02/24 1628)  SpO2: 99 % (10/02/24 1626) Vital Signs (24h Range):  Temp:  [95.9 °F (35.5 °C)-97.9 °F (36.6 °C)] 95.9 °F (35.5 °C)  Pulse:  [] 72  Resp:  [17-18] 17  SpO2:  [98 %-100 %] 99 %  BP: ()/(50-77) 110/60     Weight: 94.9 kg (209 lb 3.2 oz)  Body mass index is 30.89 kg/m².    FHT: 140s/140sCat 1 (reassuring)  TOCO:  Q 3-4 minutes     Physical Exam:   Constitutional: She is oriented to person, place, and time. She appears well-developed and well-nourished.    HENT:   Head: Normocephalic and atraumatic.    Eyes: Pupils are equal, round, and reactive to light.     Cardiovascular:  Normal rate, regular rhythm, normal heart sounds and intact distal pulses.      Exam reveals no clubbing, no cyanosis and no edema.        Pulmonary/Chest: Effort normal and breath sounds normal.        Abdominal: Soft. Bowel sounds are normal.     Genitourinary:    Vagina and uterus normal.             Musculoskeletal: Normal range of motion and moves all extremeties. No edema.       Neurological: She is alert and oriented to person, place, and time.    Skin: Skin is warm and dry. No cyanosis. Nails show no clubbing.    Psychiatric: She has a normal mood and affect. Her behavior is normal. Judgment and thought content normal.        Cervix:  Dilation:  4  Effacement:  100%  Station: -3  Presentation: Breech     Significant Labs:  Lab Results   Component Value Date    [facility] B POS 10/02/2024    HEPBSAG  Non-Reactive 04/11/2024       I have personallly reviewed all pertinent lab results from the last 24 hours.  Recent Lab Results  (Last 5 results in the past 24 hours)        10/02/24  1200   10/02/24  1156   10/02/24  1128   10/02/24  1041   10/02/24  1039        Bilirubin, POC         Negative       Spec Grav UA         >=1.030       Blood, UA         Negative       pH, UA         7.0       Protein, POC         Negative       Urobilinogen, UA         0.2       Nitrite, UA         Negative       Appearance, UA Clear               Baso #   0.02     0.02         Basophil %   0.3     0.3         Bilirubin (UA) Negative               Candida sp     Negative           Color, UA Light-Yellow               Differential Method   Auto     Auto         Eos #   0.01     0.05         Eos %   0.2     0.8         Gardnerella vaginalis     Negative           Glucose, UA         Negative       Glucose, UA Normal               Group & Rh   B POS             Hematocrit   28.8     28.9         Hemoglobin   9.5     9.4         HIV 1/2 Ag/Ab   Non-Reactive             Immature Grans (Abs)   0.03     0.03         Immature Granulocytes   0.5     0.5         INDIRECT YOSEPH   NEG             Ketones, UA         Negative       Ketones, UA Negative               Leukocyte Esterase, UA Negative               Lymph #   1.65     1.70         Lymph %   25.5     26.6         MCH   29.3     29.4         MCHC   33.0     32.5         MCV   88.9     90.3         Mono #   0.78     0.75         Mono %   12.1     11.7         MPV   11.8     12.3         Neutrophils, Abs   3.98     3.84         Neutrophils Relative   61.4     60.1         NITRITE UA Negative               nRBC   0.0     0.0         NUCLEATED RBC ABSOLUTE   0.00     0.00         Blood, UA Negative               pH, UA 6.5               Platelet Count   252     249         Protein, UA 10               RBC   3.24     3.20         RDW   13.7     14.0         Spec Grav UA 1.017                Specimen Outdate   10/05/2024 23:59             Syphilis Ab Interpretation       Non-Reactive  Comment: 0.0 - 0.9: Non-Reactive  0.91 - 1.10: Equivocal with RPR to follow  >1.10:  Reactive with RPR to Follow         Trichomonas     Negative           UROBILINOGEN UA Normal               WBC, UA         Trace       WBC   6.47     6.39                              Assessment/Plan:     24 y.o. female  at 35w2d for:    *  labor in third trimester with  delivery  Admit to L&D for primary  section      35 weeks gestation of pregnancy  .    Breech birth, fetus 1  .    Dichorionic diamniotic twin pregnancy in first trimester  .        Martha Barton MD  Obstetrics  [facility] -  Labor and Delivery

## 2024-10-02 NOTE — ANESTHESIA POSTPROCEDURE EVALUATION
Anesthesia Post Evaluation    Patient: Connie Cook    Procedure(s) Performed: Procedure(s) (LRB):   SECTION (N/A)    Final Anesthesia Type: spinal      Patient location during evaluation: labor & delivery  Patient participation: Yes- Able to Participate  Level of consciousness: awake and alert  Post-procedure vital signs: reviewed and stable  Pain management: adequate  Airway patency: patent    PONV status at discharge: No PONV  Anesthetic complications: no      Cardiovascular status: blood pressure returned to baseline  Respiratory status: unassisted  Hydration status: euvolemic  Follow-up not needed.              Vitals Value Taken Time   BP 98/52 10/02/24 1414   Temp 35.5 °C (95.9 °F) 10/02/24 1346   Pulse 76 10/02/24 1414   Resp 17 10/02/24 1340   SpO2 99 % 10/02/24 1411   Vitals shown include unfiled device data.      No case tracking events are documented in the log.      Pain/Ramu Score: No data recorded

## 2024-10-02 NOTE — SUBJECTIVE & OBJECTIVE
Obstetric HPI:  Patient reports Frequency: Every 3-4 minutes contractions, active fetal movement, No vaginal bleeding , No loss of fluid     This pregnancy has been complicated by Di-Di twins    OB History    Para Term  AB Living   2 1 0 1 0 0   SAB IAB Ectopic Multiple Live Births   0 0 0 0 0      # Outcome Date GA Lbr Shaw/2nd Weight Sex Type Anes PTL Lv   2 Current            1  20 20w0d  0.454 kg (1 lb) F Vag-Spont   FD      Obstetric Comments   1 miscarriage-Dec 2020  8 mo gestation/ vaginally delivered, early rupture of membranes     Past Medical History:   Diagnosis Date    Allergy     Asthma     Screening examination for STD (sexually transmitted disease) 2023     Past Surgical History:   Procedure Laterality Date    COLPOSCOPY, WITH BIOPSY OF CERVIX         PTA Medications   Medication Sig    aspirin (ECOTRIN) 81 MG EC tablet Take 1 tablet (81 mg total) by mouth once daily. (Patient not taking: Reported on 10/2/2024)    ferrous sulfate 325 (65 FE) MG EC tablet Take 1 tablet (325 mg total) by mouth 3 (three) times daily with meals.    ondansetron (ZOFRAN) 4 MG tablet Take 1 tablet (4 mg total) by mouth every 6 (six) hours as needed for Nausea. (Patient not taking: Reported on 10/2/2024)    PNV,calcium 72-iron-folic acid (PRENATAL VITAMIN PLUS LOW IRON) 27 mg iron- 1 mg Tab Take 1 tablet (1 each total) by mouth once daily.    promethazine (PHENERGAN) 25 MG tablet Take 1 tablet (25 mg total) by mouth every 6 (six) hours as needed for Nausea. (Patient not taking: Reported on 10/2/2024)       Review of patient's allergies indicates:   Allergen Reactions    Strawberries [strawberry] Swelling     itching        Family History       Problem Relation (Age of Onset)    Diabetes Father, Maternal Grandmother, Maternal Aunt    No Known Problems Mother, Sister, Brother, Maternal Uncle, Paternal Aunt, Paternal Grandmother, Paternal Grandfather    Stroke Maternal Grandfather           Tobacco Use    Smoking status: Never     Passive exposure: Never    Smokeless tobacco: Never   Substance and Sexual Activity    Alcohol use: Not Currently     Comment: occassional    Drug use: Not Currently     Types: Marijuana     Comment: occassional    Sexual activity: Yes     Partners: Male     Review of Systems   Constitutional:  Negative for activity change, appetite change, fatigue and unexpected weight change.   HENT: Negative.     Respiratory:  Negative for cough, shortness of breath and wheezing.    Cardiovascular:  Negative for chest pain, palpitations and leg swelling.   Gastrointestinal:  Negative for abdominal pain, bloating, blood in stool, constipation, diarrhea, nausea, vomiting and reflux.   Genitourinary:  Negative for bladder incontinence, decreased libido, dysmenorrhea, dyspareunia, dysuria, flank pain, frequency, menorrhagia, menstrual problem, pelvic pain, urgency, vaginal bleeding, vaginal discharge, postcoital bleeding and vaginal odor.   Musculoskeletal:  Negative for back pain.   Integumentary:  Negative for rash, acne, hair changes, mole/lesion, breast mass, nipple discharge, breast skin changes and breast tenderness.   Neurological:  Negative for headaches.   Psychiatric/Behavioral:  Negative for depression and sleep disturbance. The patient is not nervous/anxious.    Breast: Negative for asymmetry, breast self exam, lump, mass, nipple discharge, skin changes and tenderness     Objective:     Vital Signs (Most Recent):  Temp: (!) 95.9 °F (35.5 °C) (10/02/24 1346)  Pulse: 72 (10/02/24 1628)  Resp: 17 (10/02/24 1340)  BP: 110/60 (10/02/24 1628)  SpO2: 99 % (10/02/24 1626) Vital Signs (24h Range):  Temp:  [95.9 °F (35.5 °C)-97.9 °F (36.6 °C)] 95.9 °F (35.5 °C)  Pulse:  [] 72  Resp:  [17-18] 17  SpO2:  [98 %-100 %] 99 %  BP: ()/(50-77) 110/60     Weight: 94.9 kg (209 lb 3.2 oz)  Body mass index is 30.89 kg/m².    FHT: 140s/140sCat 1 (reassuring)  TOCO:  Q 3-4 minutes      Physical Exam:   Constitutional: She is oriented to person, place, and time. She appears well-developed and well-nourished.    HENT:   Head: Normocephalic and atraumatic.    Eyes: Pupils are equal, round, and reactive to light.     Cardiovascular:  Normal rate, regular rhythm, normal heart sounds and intact distal pulses.      Exam reveals no clubbing, no cyanosis and no edema.        Pulmonary/Chest: Effort normal and breath sounds normal.        Abdominal: Soft. Bowel sounds are normal.     Genitourinary:    Vagina and uterus normal.             Musculoskeletal: Normal range of motion and moves all extremeties. No edema.       Neurological: She is alert and oriented to person, place, and time.    Skin: Skin is warm and dry. No cyanosis. Nails show no clubbing.    Psychiatric: She has a normal mood and affect. Her behavior is normal. Judgment and thought content normal.        Cervix:  Dilation:  4  Effacement:  100%  Station: -3  Presentation: Breech     Significant Labs:  Lab Results   Component Value Date    GROUPTRH B POS 10/02/2024    HEPBSAG Non-Reactive 04/11/2024       I have personallly reviewed all pertinent lab results from the last 24 hours.  Recent Lab Results  (Last 5 results in the past 24 hours)        10/02/24  1200   10/02/24  1156   10/02/24  1128   10/02/24  1041   10/02/24  1039        Bilirubin, POC         Negative       Spec Grav UA         >=1.030       Blood, UA         Negative       pH, UA         7.0       Protein, POC         Negative       Urobilinogen, UA         0.2       Nitrite, UA         Negative       Appearance, UA Clear               Baso #   0.02     0.02         Basophil %   0.3     0.3         Bilirubin (UA) Negative               Candida sp     Negative           Color, UA Light-Yellow               Differential Method   Auto     Auto         Eos #   0.01     0.05         Eos %   0.2     0.8         Gardnerella vaginalis     Negative           Glucose, UA          Negative       Glucose, UA Normal               Group & Rh   B POS             Hematocrit   28.8     28.9         Hemoglobin   9.5     9.4         HIV 1/2 Ag/Ab   Non-Reactive             Immature Grans (Abs)   0.03     0.03         Immature Granulocytes   0.5     0.5         INDIRECT YOSEPH   NEG             Ketones, UA         Negative       Ketones, UA Negative               Leukocyte Esterase, UA Negative               Lymph #   1.65     1.70         Lymph %   25.5     26.6         MCH   29.3     29.4         MCHC   33.0     32.5         MCV   88.9     90.3         Mono #   0.78     0.75         Mono %   12.1     11.7         MPV   11.8     12.3         Neutrophils, Abs   3.98     3.84         Neutrophils Relative   61.4     60.1         NITRITE UA Negative               nRBC   0.0     0.0         NUCLEATED RBC ABSOLUTE   0.00     0.00         Blood, UA Negative               pH, UA 6.5               Platelet Count   252     249         Protein, UA 10               RBC   3.24     3.20         RDW   13.7     14.0         Spec Grav UA 1.017               Specimen Outdate   10/05/2024 23:59             Syphilis Ab Interpretation       Non-Reactive  Comment: 0.0 - 0.9: Non-Reactive  0.91 - 1.10: Equivocal with RPR to follow  >1.10:  Reactive with RPR to Follow         Trichomonas     Negative           UROBILINOGEN UA Normal               WBC, UA         Trace       WBC   6.47     6.39

## 2024-10-02 NOTE — ANESTHESIA PREPROCEDURE EVALUATION
10/02/2024  Connie Cook is a 24 y.o., female.      Pre-op Assessment    I have reviewed the Patient Summary Reports.     I have reviewed the Nursing Notes. I have reviewed the NPO Status.   I have reviewed the Medications.     Review of Systems  Anesthesia Hx:  No problems with previous Anesthesia                Social:  Non-Smoker, No Alcohol Use       Hematology/Oncology:  Hematology Normal   Oncology Normal                                   EENT/Dental:  EENT/Dental Normal           Cardiovascular:  Cardiovascular Normal                                            Pulmonary:  Pulmonary Normal                       Renal/:  Renal/ Normal                 Hepatic/GI:  Hepatic/GI Normal                 Musculoskeletal:  Musculoskeletal Normal                OB/GYN/PEDS:  primary c/s - twins           Neurological:  Neurology Normal                                      Endocrine:  Endocrine Normal            Dermatological:  Skin Normal    Psych:  Psychiatric Normal                    Physical Exam  General: Well nourished    Airway:  Mallampati: III / III  Mouth Opening: Normal  TM Distance: > 6 cm  Tongue: Normal  Neck ROM: Normal ROM    Chest/Lungs:  Clear to auscultation, Normal Respiratory Rate    Heart:  Rate: Normal  Rhythm: Regular Rhythm        Anesthesia Plan  Type of Anesthesia, risks & benefits discussed:    Anesthesia Type: Spinal  Intra-op Monitoring Plan: Standard ASA Monitors  Post Op Pain Control Plan: multimodal analgesia  Induction:  IV  Informed Consent: Informed consent signed with the Patient and all parties understand the risks and agree with anesthesia plan.  All questions answered. Patient consented to blood products? Yes  ASA Score: 2  Day of Surgery Review of History & Physical: H&P Update referred to the surgeon/provider.I have interviewed and examined the patient. I have  reviewed the patient's H&P dated:     Ready For Surgery From Anesthesia Perspective.     .

## 2024-10-02 NOTE — PROGRESS NOTES
24 y.o. female  at 35w2d   Reports fetal movement or fluttering. Denies any vaginal bleeding, leakage of fluid, cramping, contractions, or pressure.   She complains of pelvic pressure.  Pt states she is doing well without any concerns.     Patient is to go to [facility] once leaving clinic for .    Vitals  BP: 109/62  Pulse: 84  Weight: 96.5 kg (212 lb 12.8 oz)  Prenatal  Fundal Height (cm): 39 cm  Fetal Heart Rate: 130s/140s  Movement: Present  Urine Albumin/Glucose  Urine Albumin: Negative  Urine Glucose: Negative  Edema  LLE Edema: None  RLE Edema: None  Facial: None  Additional Edema?: No  Cervical Exam  Dilation: 4  Effacement (%): 70  Station: -3  Presentation: Brett Breech  Station (Labor Curve): 8 cm  Dilation/Effacement/Station  Dilation: 4  Effacement (%): 70  Station: -3    Prenatal Labs:  Lab Results   Component Value Date    GROUPTRH B POS 2024    HGB 8.7 (L) 2024    HCT 26.4 (L) 2024     2024    SICKLE Negative 2024    HEPBSAG Non-Reactive 2024    [id number] Non-Reactive 2024    LABNGO Negative 2024    LABURIN Skin/Urogenital Viv Isolated, no further workup. 2024    [id number] Negative 2021       No pregnancy checklist tasks were completed during this visit, and no tasks are pending completion.      Daily fetal kick counts, bleeding, and  labor/labor precautions discussed.  Questions answered to desired level of satisfaction  Verbalized understanding to all information and instructions provided.    Total weight gain/weight loss in pregnancy: Not found.     Follow up for 2 and 6 weeks postpartum..    A: 35w2d     ICD-10-CM ICD-9-CM    1.  screening encounter  Z36.9 V28.9 Drug Screen Definitive 14, Urine      CBC Auto Differential      Treponema Pallidum (Syphillis) Antibody      Chlamydia/GC, PCR      Bacterial Vaginosis      Strep B Screen, Antepartum      POCT Urinalysis      Chlamydia/GC, PCR       Bacterial Vaginosis      Strep B Screen, Antepartum      Drug Screen Definitive 14, Urine      CBC Auto Differential      Treponema Pallidum (Syphillis) Antibody      2. 35 weeks gestation of pregnancy  Z3A.35 V22.2 Drug Screen Definitive 14, Urine      CBC Auto Differential      Treponema Pallidum (Syphillis) Antibody      Chlamydia/GC, PCR      Bacterial Vaginosis      Strep B Screen, Antepartum      POCT Urinalysis      Chlamydia/GC, PCR      Bacterial Vaginosis      Strep B Screen, Antepartum      Drug Screen Definitive 14, Urine      CBC Auto Differential      Treponema Pallidum (Syphillis) Antibody      Case Request Operating Room:  SECTION      CANCELED: Admit to Inpatient      CANCELED: Vital signs      CANCELED: Verify informed consent      CANCELED: Fetal monitoring WITH contraction monitoring      CANCELED: [place] to [place]      CANCELED: Insert peripheral IV      CANCELED: Chlorhexidine (CHG) 2% Wipes      CANCELED: Clip and Prep Suprapubic      CANCELED: Notify NICU to attend birth      CANCELED: Notify Physician      CANCELED: Diet NPO      CANCELED: Place LIBIA hose      CANCELED: Place sequential compression device      CANCELED: Chlorohexidine Gluconate Bath      CANCELED: CBC auto differential      CANCELED: RPR      CANCELED: HIV 1/2 Ag/Ab (4th Gen)      CANCELED: POCT Cord Blood Gas Umbilical Artery Cord Gas      CANCELED: POCT Cord Blood Gas Umbilical Vein Cord Gas      CANCELED: Type & Screen      CANCELED: Inpatient consult to Anesthesiology      CANCELED: Full code      3. Screen for STD (sexually transmitted disease)  Z11.3 V74.5 Treponema Pallidum (Syphillis) Antibody      Chlamydia/GC, PCR      Bacterial Vaginosis      Chlamydia/GC, PCR      Bacterial Vaginosis      Treponema Pallidum (Syphillis) Antibody      4. High risk sexual behavior, unspecified type  Z72.51 V69.2 Treponema Pallidum (Syphillis) Antibody      Chlamydia/GC, PCR      Bacterial Vaginosis      Chlamydia/GC, PCR       Bacterial Vaginosis      Treponema Pallidum (Syphillis) Antibody      5. Encounter for other specified  screening  Z36.89 V28.9 Drug Screen Definitive 14, Urine      Drug Screen Definitive 14, Urine      6. Encounter for  screening for Streptococcus B  Z36.85 V28.6 Strep B Screen, Antepartum      Strep B Screen, Antepartum      7. Dichorionic diamniotic twin pregnancy in first trimester  O30.041 651.03 Case Request Operating Room:  SECTION     V91.03 CANCELED: Admit to Inpatient      CANCELED: Vital signs      CANCELED: Verify informed consent      CANCELED: Fetal monitoring WITH contraction monitoring      CANCELED: [place] to [place]      CANCELED: Insert peripheral IV      CANCELED: Chlorhexidine (CHG) 2% Wipes      CANCELED: Clip and Prep Suprapubic      CANCELED: Notify NICU to attend birth      CANCELED: Notify Physician      CANCELED: Diet NPO      CANCELED: Place LIBIA hose      CANCELED: Place sequential compression device      CANCELED: Chlorohexidine Gluconate Bath      CANCELED: CBC auto differential      CANCELED: RPR      CANCELED: HIV 1/2 Ag/Ab (4th Gen)      CANCELED: POCT Cord Blood Gas Umbilical Artery Cord Gas      CANCELED: POCT Cord Blood Gas Umbilical Vein Cord Gas      CANCELED: Type & Screen      CANCELED: Inpatient consult to Anesthesiology      CANCELED: Full code      8. Breech presentation, fetus 1 of multiple gestation  O32.1XX1 652.20 Case Request Operating Room:  SECTION      CANCELED: Admit to Inpatient      CANCELED: Vital signs      CANCELED: Verify informed consent      CANCELED: Fetal monitoring WITH contraction monitoring      CANCELED: [place] to [place]      CANCELED: Insert peripheral IV      CANCELED: Chlorhexidine (CHG) 2% Wipes      CANCELED: Clip and Prep Suprapubic      CANCELED: Notify NICU to attend birth      CANCELED: Notify Physician      CANCELED: Diet NPO      CANCELED: Place LIBIA hose      CANCELED: Place sequential compression  device      CANCELED: Chlorohexidine Gluconate Bath      CANCELED: CBC auto differential      CANCELED: RPR      CANCELED: HIV 1/2 Ag/Ab (4th Gen)      CANCELED: POCT Cord Blood Gas Umbilical Artery Cord Gas      CANCELED: POCT Cord Blood Gas Umbilical Vein Cord Gas      CANCELED: Type & Screen      CANCELED: Inpatient consult to Anesthesiology      CANCELED: Full code      9. Premature labor after 22 weeks gestation  O60.00 644.20 Case Request Operating Room:  SECTION      CANCELED: Admit to Inpatient      CANCELED: Vital signs      CANCELED: Verify informed consent      CANCELED: Fetal monitoring WITH contraction monitoring      CANCELED: [place] to [place]      CANCELED: Insert peripheral IV      CANCELED: Chlorhexidine (CHG) 2% Wipes      CANCELED: Clip and Prep Suprapubic      CANCELED: Notify NICU to attend birth      CANCELED: Notify Physician      CANCELED: Diet NPO      CANCELED: Place LIBIA hose      CANCELED: Place sequential compression device      CANCELED: Chlorohexidine Gluconate Bath      CANCELED: CBC auto differential      CANCELED: RPR      CANCELED: HIV 1/2 Ag/Ab (4th Gen)      CANCELED: POCT Cord Blood Gas Umbilical Artery Cord Gas      CANCELED: POCT Cord Blood Gas Umbilical Vein Cord Gas      CANCELED: Type & Screen      CANCELED: Inpatient consult to Anesthesiology      CANCELED: Full code            Martha Barton M.D., FCOG    OB/GYN

## 2024-10-02 NOTE — HPI
This is a 24 year old  100 admitted at 35 week 2 day for primary  section secondary to advanced cervical dilation with twin gestation with presenting twin a in the stella breech position.  Patient was seen in the office and found to be dilated to 4 cm.  Both babies were in the breech presentation.  She was therefore sent to labor and delivery for primary  section.  Prenatal care with Dr. Carlton complicated by dichorionic diamniotic twin gestation as well as breech presentation.  Patient's blood type B positive.  Her GBS was unknown.

## 2024-10-02 NOTE — TRANSFER OF CARE
"Anesthesia Transfer of Care Note    Patient: Connie Cook    Procedure(s) Performed: Procedure(s) (LRB):   SECTION (N/A)    Patient location: PACU    Anesthesia Type: general    Transport from OR: Transported from OR on room air with adequate spontaneous ventilation    Post pain: adequate analgesia    Post assessment: no apparent anesthetic complications and tolerated procedure well    Post vital signs: stable    Level of consciousness: responds to stimulation    Nausea/Vomiting: no nausea/vomiting    Complications: none    Transfer of care protocol was followedComments: Report Given to PACU rn VSS      Last vitals: Visit Vitals  BP (!) 105/55 (BP Location: Right arm, Patient Position: Lying)   Pulse 103   Temp 36.6 °C (97.8 °F)   Resp 17   Ht 5' 9" (1.753 m)   Wt 94.9 kg (209 lb 3.2 oz)   LMP 2024 (Approximate)   SpO2 98%   Breastfeeding No   BMI 30.89 kg/m²     "

## 2024-10-02 NOTE — ANESTHESIA PROCEDURE NOTES
Spinal    Diagnosis: twin gestation, primary  Patient location during procedure: OR  Start time: 10/2/2024 12:48 PM  Timeout: 10/2/2024 12:47 PM  End time: 10/2/2024 12:51 PM    Staffing  Authorizing Provider: Westley Rosenthal MD  Performing Provider: Westley Rosenthal MD    Staffing  Performed by: Westley Rosenthal MD  Authorized by: Westley Rosenthal MD    Preanesthetic Checklist  Completed: patient identified, IV checked, risks and benefits discussed, surgical consent, monitors and equipment checked, pre-op evaluation and timeout performed  Spinal Block  Patient position: sitting  Prep: Betadine  Patient monitoring: heart rate, continuous pulse ox, continuous capnometry and frequent blood pressure checks  Approach: midline  Location: L3-4  Injection technique: single shot  CSF Fluid: clear free-flowing CSF  Needle  Needle type: Quincke   Needle gauge: 22 G  Needle length: 4 in  Needle localization: anatomical landmarks  Assessment  Sensory level: T8   Dermatomal levels determined by alcohol wipe  Ease of block: easy  Patient's tolerance of the procedure: comfortable throughout block and no complaints  Medications:    Medications: BUPivacaine (pf) (MARCAINE) injection 0.75% - Intraspinal   1.25 mL - 10/2/2024 12:50:00 PM

## 2024-10-03 LAB
AMPHET UR QL SCN: NEGATIVE
BARBITURATES UR QL SCN: NEGATIVE
BASOPHILS # BLD AUTO: 0.02 K/UL (ref 0–0.2)
BASOPHILS NFR BLD AUTO: 0.1 % (ref 0–1)
BENZODIAZ METAB UR QL SCN: NEGATIVE
CANNABINOIDS UR QL SCN: NEGATIVE
CHLAMYDIA BY PCR: NEGATIVE
COCAINE UR QL SCN: NEGATIVE
DIFFERENTIAL METHOD BLD: ABNORMAL
EOSINOPHIL # BLD AUTO: 0 K/UL (ref 0–0.5)
EOSINOPHIL NFR BLD AUTO: 0 % (ref 1–4)
ERYTHROCYTE [DISTWIDTH] IN BLOOD BY AUTOMATED COUNT: 13.8 % (ref 11.5–14.5)
GROUP B STREP, PCR: NEGATIVE
HCT VFR BLD AUTO: 26.3 % (ref 38–47)
HGB BLD-MCNC: 8.8 G/DL (ref 12–16)
IMM GRANULOCYTES # BLD AUTO: 0.09 K/UL (ref 0–0.04)
IMM GRANULOCYTES NFR BLD: 0.6 % (ref 0–0.4)
LYMPHOCYTES # BLD AUTO: 0.94 K/UL (ref 1–4.8)
LYMPHOCYTES NFR BLD AUTO: 6.1 % (ref 27–41)
MCH RBC QN AUTO: 30 PG (ref 27–31)
MCHC RBC AUTO-ENTMCNC: 33.5 G/DL (ref 32–36)
MCV RBC AUTO: 89.8 FL (ref 80–96)
MONOCYTES # BLD AUTO: 1.43 K/UL (ref 0–0.8)
MONOCYTES NFR BLD AUTO: 9.2 % (ref 2–6)
MPC BLD CALC-MCNC: 12.3 FL (ref 9.4–12.4)
N. GONORRHOEAE (GC) BY PCR: NEGATIVE
NEUTROPHILS # BLD AUTO: 13 K/UL (ref 1.8–7.7)
NEUTROPHILS NFR BLD AUTO: 84 % (ref 53–65)
NRBC # BLD AUTO: 0 X10E3/UL
NRBC, AUTO (.00): 0 %
OPIATES UR QL SCN: NEGATIVE
PCP UR QL SCN: NEGATIVE
PLATELET # BLD AUTO: 216 K/UL (ref 150–400)
RBC # BLD AUTO: 2.93 M/UL (ref 4.2–5.4)
WBC # BLD AUTO: 15.48 K/UL (ref 4.5–11)

## 2024-10-03 PROCEDURE — 25000003 PHARM REV CODE 250: Performed by: OBSTETRICS & GYNECOLOGY

## 2024-10-03 PROCEDURE — 11000001 HC ACUTE MED/SURG PRIVATE ROOM

## 2024-10-03 PROCEDURE — 36415 COLL VENOUS BLD VENIPUNCTURE: CPT | Performed by: OBSTETRICS & GYNECOLOGY

## 2024-10-03 PROCEDURE — 63600175 PHARM REV CODE 636 W HCPCS: Performed by: OBSTETRICS & GYNECOLOGY

## 2024-10-03 PROCEDURE — 85025 COMPLETE CBC W/AUTO DIFF WBC: CPT | Performed by: OBSTETRICS & GYNECOLOGY

## 2024-10-03 PROCEDURE — 27000980 HC MATTRESS, OVERLAY WAFFLE

## 2024-10-03 RX ADMIN — DOCUSATE SODIUM 200 MG: 100 CAPSULE, LIQUID FILLED ORAL at 09:10

## 2024-10-03 RX ADMIN — IBUPROFEN 800 MG: 800 TABLET, FILM COATED ORAL at 06:10

## 2024-10-03 RX ADMIN — KETOROLAC TROMETHAMINE 30 MG: 30 INJECTION, SOLUTION INTRAMUSCULAR at 05:10

## 2024-10-03 RX ADMIN — MUPIROCIN: 20 OINTMENT TOPICAL at 09:10

## 2024-10-03 RX ADMIN — OXYCODONE HYDROCHLORIDE AND ACETAMINOPHEN 1 TABLET: 5; 325 TABLET ORAL at 02:10

## 2024-10-03 RX ADMIN — OXYCODONE AND ACETAMINOPHEN 1 TABLET: 10; 325 TABLET ORAL at 08:10

## 2024-10-03 RX ADMIN — DOCUSATE SODIUM 200 MG: 100 CAPSULE, LIQUID FILLED ORAL at 08:10

## 2024-10-03 RX ADMIN — Medication 1 TABLET: at 09:10

## 2024-10-03 RX ADMIN — KETOROLAC TROMETHAMINE 30 MG: 30 INJECTION, SOLUTION INTRAMUSCULAR at 11:10

## 2024-10-04 PROBLEM — Z98.891 STATUS POST CESAREAN DELIVERY: Status: ACTIVE | Noted: 2024-10-04

## 2024-10-04 PROCEDURE — 25000003 PHARM REV CODE 250: Performed by: OBSTETRICS & GYNECOLOGY

## 2024-10-04 PROCEDURE — 11000001 HC ACUTE MED/SURG PRIVATE ROOM

## 2024-10-04 RX ORDER — HYDROCODONE BITARTRATE AND ACETAMINOPHEN 7.5; 325 MG/1; MG/1
1 TABLET ORAL EVERY 6 HOURS PRN
Qty: 28 TABLET | Refills: 0 | Status: SHIPPED | OUTPATIENT
Start: 2024-10-04

## 2024-10-04 RX ADMIN — SIMETHICONE 80 MG: 80 TABLET, CHEWABLE ORAL at 03:10

## 2024-10-04 RX ADMIN — Medication 1 TABLET: at 08:10

## 2024-10-04 RX ADMIN — DOCUSATE SODIUM 200 MG: 100 CAPSULE, LIQUID FILLED ORAL at 08:10

## 2024-10-04 RX ADMIN — OXYCODONE AND ACETAMINOPHEN 1 TABLET: 10; 325 TABLET ORAL at 10:10

## 2024-10-04 RX ADMIN — IBUPROFEN 800 MG: 800 TABLET, FILM COATED ORAL at 10:10

## 2024-10-04 RX ADMIN — DOCUSATE SODIUM 200 MG: 100 CAPSULE, LIQUID FILLED ORAL at 10:10

## 2024-10-04 RX ADMIN — MUPIROCIN: 20 OINTMENT TOPICAL at 08:10

## 2024-10-04 RX ADMIN — IBUPROFEN 800 MG: 800 TABLET, FILM COATED ORAL at 05:10

## 2024-10-04 RX ADMIN — OXYCODONE AND ACETAMINOPHEN 1 TABLET: 10; 325 TABLET ORAL at 05:10

## 2024-10-04 RX ADMIN — IBUPROFEN 800 MG: 800 TABLET, FILM COATED ORAL at 03:10

## 2024-10-04 RX ADMIN — OXYCODONE HYDROCHLORIDE AND ACETAMINOPHEN 1 TABLET: 5; 325 TABLET ORAL at 03:10

## 2024-10-04 NOTE — PLAN OF CARE
[facility] -  Labor and Delivery  Initial Discharge Assessment       Primary Care Provider: Lavern Rapp FNP-C    Admission Diagnosis: 35 weeks gestation of pregnancy [Z3A.35]  Dichorionic diamniotic twin pregnancy in first trimester [O30.041]  Breech presentation, fetus 1 of multiple gestation [O32.1XX1]  Premature labor after 22 weeks gestation [O60.00]   screening encounter [Z36.9]    Admission Date: 10/2/2024  Expected Discharge Date: 10/5/2024    Transition of Care Barriers: None    Payor: MEDICAID [place] / Plan: MEDICAID [facility] [place] PLAN [place] / Product Type: Managed Medicaid /     Extended Emergency Contact Information  Primary Emergency Contact: OSWALDO KUMAR  Mobile Phone: [phone]  Relation: Mother  Preferred language: English   needed? No    Discharge Plan A: Home with family  Discharge Plan B: Home with family      [facility] - [place], [place] - [address] [place] [address]  [address] [place] [address] [place] [address]  Phone: [phone] Fax: [phone]    [id number] - [place], [place] - [address] AT [facility] [address]  [address]  [facility] [place] [address]  Phone: [phone] Fax: [phone]    [facility] - [place], [place] - [address]  [address]  [place] [address]  Phone: [phone] Fax: [phone]    [facility]  [address] [place] [address]  Phone: [phone] Fax: [phone]      Initial Assessment (most recent)       Adult Discharge Assessment - 10/04/24 1332          Discharge Assessment    Assessment Type Discharge Planning Assessment     Confirmed/corrected address, phone number and insurance Yes     Confirmed Demographics Correct on Facesheet     Source of Information patient     Communicated JEAN-CLAUDE with patient/caregiver Date not available/Unable to determine;Yes     People in Home significant other     Do you expect to return to your current living situation?  Yes     Do you have help at home or someone to help you manage your care at home? No     Prior to hospitilization cognitive status: Unable to Assess     Current cognitive status: Alert/Oriented     Walking or Climbing Stairs Difficulty no     Dressing/Bathing Difficulty no     Home Layout Able to live on 1st floor     Equipment Currently Used at Home none     Readmission within 30 days? No     Patient currently being followed by outpatient case management? No     Do you currently have service(s) that help you manage your care at home? No     Do you take prescription medications? No     Do you have prescription coverage? Yes     Coverage medicaid     Do you have any problems affording any of your prescribed medications? No     Who is going to help you get home at discharge? family     How do you get to doctors appointments? family or friend will provide;car, drives self     Are you on dialysis? No     Do you take coumadin? No     Discharge Plan A Home with family     Discharge Plan B Home with family     Discharge Plan discussed with: Patient     Transition of Care Barriers None        Physical Activity    On average, how many days per week do you engage in moderate to strenuous exercise (like a brisk walk)? 0 days     On average, how many minutes do you engage in exercise at this level? 0 min        Financial Resource Strain    How hard is it for you to pay for the very basics like food, housing, medical care, and heating? Not hard at all        Housing Stability    In the last 12 months, was there a time when you were not able to pay the mortgage or rent on time? No     At any time in the past 12 months, were you homeless or living in a shelter (including now)? No        Transportation Needs    Has the lack of transportation kept you from medical appointments, meetings, work or from getting things needed for daily living? No        Food Insecurity    Within the past 12 months, you worried that your food would run  out before you got the money to buy more. Never true     Within the past 12 months, the food you bought just didn't last and you didn't have money to get more. Never true        Stress    Do you feel stress - tense, restless, nervous, or anxious, or unable to sleep at night because your mind is troubled all the time - these days? Not at all        Social Isolation    How often do you feel lonely or isolated from those around you?  Never        Alcohol Use    Q1: How often do you have a drink containing alcohol? Never     Q2: How many drinks containing alcohol do you have on a typical day when you are drinking? Patient does not drink     Q3: How often do you have six or more drinks on one occasion? Never        Utilities    In the past 12 months has the electric, gas, oil, or water company threatened to shut off services in your home? No        Health Literacy    How often do you need to have someone help you when you read instructions, pamphlets, or other written material from your doctor or pharmacy? Never                        Spoke with mother now, rc'd consult d/t to infant in NICU. Mother and infant will return home when medically stable. Declines PHRMISS at this time. Will follow dc needs as arise.

## 2024-10-04 NOTE — PROGRESS NOTES
Subjective: Pt is comfortable and without compliant    Objective:   VSS  Uterus firm, Incision clean , dry and intact  Moderate lochia  Extremities- no cyanosis, clubbing or edema      Assessment:  POD #2 s/p LTCS- stable    Plan: Routine postpartum and postop care with discharge planning   Babies still in the NICU- will plan for discharge in the a.m.

## 2024-10-04 NOTE — L&D DELIVERY NOTE
[facility] -  Labor and Delivery   Section   Operative Note    SUMMARY     Date of Procedure: 10/2/2024     Procedure: Procedure(s) (LRB):   SECTION (N/A)    Surgeons and Role:     * Martha Barton MD - Primary    Assisting Surgeon: None    Pre-Operative Diagnosis: 35 weeks gestation of pregnancy [Z3A.35]  Dichorionic diamniotic twin pregnancy in first trimester [O30.041]  Breech presentation, fetus 1 of multiple gestation [O32.1XX1]  Premature labor after 22 weeks gestation [O60.00]    Post-Operative Diagnosis: Post-Op Diagnosis Codes:     * 35 weeks gestation of pregnancy [Z3A.35]     * Dichorionic diamniotic twin pregnancy in first trimester [O30.041]     * Breech presentation, fetus 1 of multiple gestation [O32.1XX1]     * Premature labor after 22 weeks gestation [O60.00]    Anesthesia: Spinal/Epidural    Technical Procedures Used: primary LTCS           Description of the Findings of the Procedure: live born twin female neonates both in breech position    Significant Surgical Tasks Conducted by the Assistant(s), if Applicable: none    Complications: No    Blood Loss:500 ml   With patient in supine position, the legs are  and Lawrence Catheter placed and positioning to supine done.   Abdomen prepped with Chloroprep and 3 minute drying time allowed prior to draping of the abdomen.   Time out taken with OR team members.  Following informed consent the patient was taken to the operating room where spinal anesthesia was administered without difficulty. She was prepped and draped in the usual sterile fashion and placed in the dorsal lithotomy position. A Pfannenstiel skin incision was made with the scalpel and the underlying layer of fascia, entered with the Bovie. The fascial incision was extended laterally. The inferior and superior aspects of the fascial incision were grasped with [place] clamps, elevated and the rectus muscles dissected off bluntly. The rectus muscles were  then  in the midline and the peritoneum identified and entered with Metzenbaum scissors. This was then extended superiorly and inferiorly with good visualization of the bladder. The bladder blade was inserted and the vesicouterine peritoneum identified and entered with the Metzenbaum scissors. This incision extended superiorly and inferiorly and a bladder flap created digitally. The bladder blade was then reinserted and the uterus incised in a transverse fashion with the scalpel. This was extended laterally.Baby A was in the stella breech position. The body was delivered to the level of the shoulders, anterior and posterior shoulders swept across the body and baby's head was delivered atraumatically. The nose and mouth were bulb suctioned. The cord was clamped and cut infant the infant was handed off to the waiting nurses. Cord blood was sent. The same technique was used to deliver Baby B who was also in the stella breech position. Placenta was then removed manually and the uterus cleared of all clots and debris  The uterine incision was repaired with #1 Vicryl in a running locked fashion. A second layer of the same suture was used to obtain hemostasis. The gutters when cleared of all clots and debris and once again hemostasis found be satisfactory.  All instruments were then removed from the abdomen. The fascial incision per 0 Vicryl in a running fashion. Skin was closed with interrupted staples. At the end procedure all sponge lap needle sponge counts correct ×2. Patient taken cover awake and stable condition.                 Specimens:   Specimen (24h ago, onward)      None            Condition: Good    VTE Risk Mitigation (From admission, onward)           Ordered     IP VTE LOW RISK PATIENT  Once         10/02/24 1141     Place LIBIA hose  Until discontinued         10/02/24 1141     Place sequential compression device  Until discontinued         10/02/24 1141                    Disposition: PACU -  "hemodynamically stable.    Attestation: Good            Allen Cook [[id number]]   Delivery Information for Allen Cook    Birth information:  YOB: 2024   Time of birth: 1:12 PM   Sex: female   Head Delivery Date/Time: 10/2/2024  1:12 PM   Delivery type: , Low Transverse   Gestational Age: 35w2d       Delivery Providers    Delivering clinician: Martha Barton MD           Measurements    Weight: 2148 g  Weight (lbs): 4 lb 11.8 oz  Length: 45.7 cm  Length (in): 18"         Apgars    Living status: Living  Apgar Component Scores:  1 min.:  5 min.:  10 min.:  15 min.:  20 min.:    Skin color:  0  1       Heart rate:  2  2       Reflex irritability:  2  2       Muscle tone:  2  2       Respiratory effort:  2  2       Total:  8  9       Apgars assigned by: JUAN WILKES         Operative Delivery    Forceps attempted?: No  Vacuum extractor attempted?: No         Shoulder Dystocia    Shoulder dystocia present?: No           Presentation    Presentation: Brett Breech           Interventions/Resuscitation    Method: Bulb Suctioning, Tactile Stimulation, NICU Attended       Cord    Complications: None  Cord Blood Disposition: Sent with Baby  Gases Sent?: No  Stem Cell Collection (by MD): No       Placenta    Placenta delivery date/time: 10/2/2024 1314  Placenta removal: Manual removal  Placenta appearance: Intact  Placenta disposition: Discarded           Labor Events:       labor:       Labor Onset Date/Time:         Dilation Complete Date/Time:         Start Pushing Date/Time:       Rupture Date/Time:            Rupture type:          Fluid Amount:       Fluid Color:        steroids:       Antibiotics given for GBS:       Induction:       Indications for induction:        Augmentation:       Indications for augmentation:       Labor complications:       Additional complications:          Cervical ripening:                     Delivery:      Episiotomy:     " "  Indication for Episiotomy:       Perineal Lacerations:   Repaired:      Periurethral Laceration:   Repaired:     Labial Laceration:   Repaired:     Sulcus Laceration:   Repaired:     Vaginal Laceration:   Repaired:     Cervical Laceration:   Repaired:     Repair suture:       Repair # of packets:       Last Value - EBL - Nursing (mL):       Sum - EBL - Nursing (mL): 0     Last Value - EBL - Anesthesia (mL):      Calculated QBL (mL): 605     Running total QBL (mL): 605     Vaginal Sweep Performed:       Surgicount Correct:         Other providers:       Anesthesia    Method: Spinal          Details (if applicable):  Trial of Labor No    Categorization: Primary    Priority: Routine   Indications for : Breech   Incision Type: low transverse     Additional  information:  Forceps:    Vacuum:    Breech:    Observed anomalies    Other (Comments):            Joey MELVIN Rosales [[id number]]   Delivery Information for MELVIN Cook    Birth information:  YOB: 2024   Time of birth: 1:13 PM   Sex: female   Head Delivery Date/Time: 10/2/2024  1:13 PM   Delivery type: , Low Transverse   Gestational Age: 35w2d       Delivery Providers    Delivering clinician: Martha Barton MD           Measurements    Weight: 1930 g  Weight (lbs): 4 lb 4.1 oz  Length: 43.2 cm  Length (in): 17"         Apgars    Living status: Living  Apgar Component Scores:  1 min.:  5 min.:  10 min.:  15 min.:  20 min.:    Skin color:  1  1       Heart rate:  2  2       Reflex irritability:  2  2       Muscle tone:  2  2       Respiratory effort:  2  2       Total:  9  9       Apgars assigned by: JUAN WILKES         Operative Delivery    Forceps attempted?: No  Vacuum extractor attempted?: No         Shoulder Dystocia    Shoulder dystocia present?: No           Presentation    Presentation: [place] Breech           Interventions/Resuscitation    Method: Bulb Suctioning, NICU " Attended       Cord    Complications: None  Cord Blood Disposition: Sent with Baby  Gases Sent?: No  Stem Cell Collection (by MD): No       Placenta    Placenta delivery date/time: 10/2/2024 1314  Placenta removal: Manual removal  Placenta appearance: Intact  Placenta disposition: Discarded           Labor Events:       labor:       Labor Onset Date/Time:         Dilation Complete Date/Time:         Start Pushing Date/Time:       Rupture Date/Time:            Rupture type:          Fluid Amount:       Fluid Color:        steroids:       Antibiotics given for GBS:       Induction:       Indications for induction:        Augmentation:       Indications for augmentation:       Labor complications:       Additional complications:          Cervical ripening:                     Delivery:      Episiotomy:       Indication for Episiotomy:       Perineal Lacerations:   Repaired:      Periurethral Laceration:   Repaired:     Labial Laceration:   Repaired:     Sulcus Laceration:   Repaired:     Vaginal Laceration:   Repaired:     Cervical Laceration:   Repaired:     Repair suture:       Repair # of packets:       Last Value - EBL - Nursing (mL):       Sum - EBL - Nursing (mL): 0     Last Value - EBL - Anesthesia (mL):      Calculated QBL (mL): 605     Running total QBL (mL): 605     Vaginal Sweep Performed:       Surgicount Correct:         Other providers:       Anesthesia    Method: Spinal          Details (if applicable):  Trial of Labor No    Categorization: Primary    Priority: Routine   Indications for : Breech   Incision Type: low transverse     Additional  information:  Forceps:    Vacuum:    Breech:    Observed anomalies    Other (Comments):

## 2024-10-04 NOTE — LACTATION NOTE
This note was copied from a baby's chart.  Breastfeeding rounds done, mom reports getting more milk with pumping, mom denies questions or concerns  
show

## 2024-10-04 NOTE — HOSPITAL COURSE
This is a 24-year-old G 022 admitted 30/5 weeks day secondary to premature labor, station and now presentation.  Patient delivered live-born female twin babies via primary .  Baby a was in the stella breech position birth weight 4 lb 11 oz and Apgars of 8 9.  Baby B was also in the stella breech position with a birth weight of 4 lb 4 oz Apgars of 9 and 9.  The patient's hospital course was relatively unremarkable by postoperative day 3. She was ready for discharge.  Disposition was tone.  Condition stable.  Patient was formula feeding and undecided regarding her method of contraception.

## 2024-10-05 VITALS
SYSTOLIC BLOOD PRESSURE: 113 MMHG | WEIGHT: 209.19 LBS | DIASTOLIC BLOOD PRESSURE: 73 MMHG | HEART RATE: 72 BPM | HEIGHT: 69 IN | RESPIRATION RATE: 18 BRPM | BODY MASS INDEX: 30.98 KG/M2 | OXYGEN SATURATION: 99 % | TEMPERATURE: 98 F

## 2024-10-05 PROCEDURE — 25000003 PHARM REV CODE 250: Performed by: OBSTETRICS & GYNECOLOGY

## 2024-10-05 RX ADMIN — Medication 1 TABLET: at 08:10

## 2024-10-05 RX ADMIN — ONDANSETRON 8 MG: 4 TABLET, ORALLY DISINTEGRATING ORAL at 03:10

## 2024-10-05 RX ADMIN — MUPIROCIN: 20 OINTMENT TOPICAL at 08:10

## 2024-10-05 RX ADMIN — OXYCODONE AND ACETAMINOPHEN 1 TABLET: 10; 325 TABLET ORAL at 03:10

## 2024-10-05 RX ADMIN — OXYCODONE HYDROCHLORIDE AND ACETAMINOPHEN 1 TABLET: 5; 325 TABLET ORAL at 08:10

## 2024-10-05 RX ADMIN — DOCUSATE SODIUM 200 MG: 100 CAPSULE, LIQUID FILLED ORAL at 08:10

## 2024-10-05 NOTE — NURSING
Topic Nurse Date Time Comments   PP Education Booklet Given KB 10/5/2024   9012    Skin to skin       Rooming in       Importance of Exclusive Breastfeeding for 6 mo       Bleeding KB      Incision Care KB      Sex KB      Pain Management KB      Perineal Care KB      Minimizing Engorgement       Collection & Storage of BM       S/S of BF Problems KB      Hand Expression       Maternal s/s breast problems KB      Mgnt of Common BF Problems (engorgement, sore & cracked nipples)       PPD/Anxiety KB

## 2024-10-05 NOTE — PLAN OF CARE
Problem: Adult Inpatient Plan of Care  Goal: Plan of Care Review  10/5/2024 1142 by Tyesha Luz RN  Outcome: Met  10/5/2024 0737 by Tyesha Luz RN  Outcome: Progressing  Goal: Patient-Specific Goal (Individualized)  10/5/2024 114 by Tyesha Luz RN  Outcome: Met  10/5/2024 0737 by Tyesha Luz RN  Outcome: Progressing  Goal: Absence of Hospital-Acquired Illness or Injury  10/5/2024 1142 by Tyesha Luz RN  Outcome: Met  10/5/2024 0737 by yTesha Luz RN  Outcome: Progressing  Goal: Optimal Comfort and Wellbeing  10/5/2024 114 by Tyesha Luz RN  Outcome: Met  10/5/2024 07 by Tyesha Luz RN  Outcome: Progressing  Goal: Readiness for Transition of Care  10/5/2024 114 by Tyesha Luz RN  Outcome: Met  10/5/2024 07 by Tyesha Luz RN  Outcome: Progressing     Problem:  Fall Injury Risk  Goal: Absence of Fall, Infant Drop and Related Injury  10/5/2024 114 by Tyesha Luz RN  Outcome: Met  10/5/2024 07 by Tyesha Luz RN  Outcome: Progressing     Problem: Infection  Goal: Absence of Infection Signs and Symptoms  10/5/2024 114 by Tyesha Luz RN  Outcome: Met  10/5/2024 07 by Tyesha Luz RN  Outcome: Progressing     Problem: Wound  Goal: Optimal Coping  10/5/2024 1142 by Tyesha Luz RN  Outcome: Met  10/5/2024 0737 by Tyesha Luz RN  Outcome: Progressing  Goal: Optimal Functional Ability  10/5/2024 114 by Tyesha Luz RN  Outcome: Met  10/5/2024 0737 by Tyesha Luz RN  Outcome: Progressing  Goal: Absence of Infection Signs and Symptoms  10/5/2024 114 by Tyesha Luz RN  Outcome: Met  10/5/2024 0737 by Tyesha Luz RN  Outcome: Progressing  Goal: Improved Oral Intake  10/5/2024 1142 by Tyesha Luz, ALISA  Outcome: Met  10/5/2024 0737 by Tyesha Luz RN  Outcome: Progressing  Goal: Optimal Pain Control and Function  10/5/2024 114 by Tyesha Luz, ALISA  Outcome: Met  10/5/2024 0737 by Tyesha Luz, RN  Outcome: Progressing  Goal: Skin Health and Integrity  10/5/2024 114 by  Tyesha Luz RN  Outcome: Met  10/5/2024 0737 by Tyesha Luz RN  Outcome: Progressing  Goal: Optimal Wound Healing  10/5/2024 1142 by Tyesha Luz RN  Outcome: Met  10/5/2024 0737 by Tyesha Luz RN  Outcome: Progressing     Problem: Postpartum ( Delivery)  Goal: Successful Parent Role Transition  10/5/2024 1142 by Tyesha Luz RN  Outcome: Met  10/5/2024 0737 by Tyesha Luz RN  Outcome: Progressing  Goal: Hemostasis  10/5/2024 1142 by Tyesha Luz RN  Outcome: Met  10/5/2024 0737 by Tyesha Luz RN  Outcome: Progressing  Goal: Effective Bowel Elimination  10/5/2024 114 by Tyesha Luz RN  Outcome: Met  10/5/2024 0737 by Tyesha Luz RN  Outcome: Progressing  Goal: Fluid and Electrolyte Balance  10/5/2024 1142 by Tyesha Luz RN  Outcome: Met  10/5/2024 0737 by Tyesha Luz RN  Outcome: Progressing  Goal: Absence of Infection Signs and Symptoms  10/5/2024 114 by Tyesha Luz RN  Outcome: Met  10/5/2024 0737 by Tyesha Luz RN  Outcome: Progressing  Goal: Anesthesia/Sedation Recovery  10/5/2024 114 by Tyesha Luz RN  Outcome: Met  10/5/2024 0737 by Tyesha Luz RN  Outcome: Progressing  Goal: Optimal Pain Control and Function  10/5/2024 1142 by Tyesha Luz RN  Outcome: Met  10/5/2024 0737 by Tyesha Luz RN  Outcome: Progressing  Goal: Nausea and Vomiting Relief  10/5/2024 114 by Tyesha Luz RN  Outcome: Met  10/5/2024 0737 by Tyesha Luz RN  Outcome: Progressing  Goal: Effective Urinary Elimination  10/5/2024 1142 by Tyesha Luz RN  Outcome: Met  10/5/2024 0737 by Tyesha Luz RN  Outcome: Progressing  Goal: Effective Oxygenation and Ventilation  10/5/2024 1142 by Tyesha Luz, RN  Outcome: Met  10/5/2024 0737 by Tyesha Luz, RN  Outcome: Progressing

## 2024-10-07 NOTE — PLAN OF CARE
[facility] -  Labor and Delivery  Discharge Final Note    Primary Care Provider: Lavern Rapp FNP-C    Expected Discharge Date: 10/5/2024    Final Discharge Note (most recent)       Final Note - 10/07/24 1054          Final Note    Assessment Type Final Discharge Note     Anticipated Discharge Disposition Home or Self Care        Post-Acute Status    Discharge Delays None known at this time                     Important Message from Medicare             Contact Info       Martha Barton MD   Specialty: Obstetrics and Gynecology    [facility] [place] [address]  [facility] [id number]   Phone: [phone]       Next Steps: Follow up in 2 week(s)          Pt dc home

## 2024-10-14 ENCOUNTER — TELEPHONE (OUTPATIENT)
Dept: OBSTETRICS AND GYNECOLOGY | Facility: CLINIC | Age: 24
End: 2024-10-14
Payer: MEDICAID

## 2024-10-14 ENCOUNTER — HOSPITAL ENCOUNTER (EMERGENCY)
Facility: HOSPITAL | Age: 24
Discharge: HOME | End: 2024-10-15
Attending: EMERGENCY MEDICINE
Payer: MEDICAID

## 2024-10-14 DIAGNOSIS — R10.9 ABDOMINAL PAIN, UNSPECIFIED ABDOMINAL LOCATION: ICD-10-CM

## 2024-10-14 DIAGNOSIS — G89.18 POST-OP PAIN: Primary | ICD-10-CM

## 2024-10-14 LAB
ALBUMIN SERPL BCP-MCNC: 3.1 G/DL (ref 3.5–5)
ALBUMIN/GLOB SERPL: 0.7 {RATIO}
ALP SERPL-CCNC: 110 U/L (ref 37–98)
ALT SERPL W P-5'-P-CCNC: 16 U/L (ref 13–56)
ANION GAP SERPL CALCULATED.3IONS-SCNC: 9 MMOL/L (ref 7–16)
AST SERPL W P-5'-P-CCNC: 15 U/L (ref 15–37)
BASOPHILS # BLD AUTO: 0.02 K/UL (ref 0–0.2)
BASOPHILS NFR BLD AUTO: 0.2 % (ref 0–1)
BILIRUB SERPL-MCNC: 0.3 MG/DL (ref ?–1.2)
BILIRUB UR QL STRIP: NEGATIVE
BUN SERPL-MCNC: 11 MG/DL (ref 7–18)
BUN/CREAT SERPL: 17 (ref 6–20)
CALCIUM SERPL-MCNC: 9.5 MG/DL (ref 8.5–10.1)
CHLORIDE SERPL-SCNC: 107 MMOL/L (ref 98–107)
CLARITY UR: CLEAR
CO2 SERPL-SCNC: 26 MMOL/L (ref 21–32)
COLOR UR: YELLOW
CREAT SERPL-MCNC: 0.64 MG/DL (ref 0.55–1.02)
DIFFERENTIAL METHOD BLD: ABNORMAL
EGFR (NO RACE VARIABLE) (RUSH/TITUS): 127 ML/MIN/1.73M2
EOSINOPHIL # BLD AUTO: 0.05 K/UL (ref 0–0.5)
EOSINOPHIL NFR BLD AUTO: 0.6 % (ref 1–4)
ERYTHROCYTE [DISTWIDTH] IN BLOOD BY AUTOMATED COUNT: 14.4 % (ref 11.5–14.5)
GLOBULIN SER-MCNC: 4.7 G/DL (ref 2–4)
GLUCOSE SERPL-MCNC: 85 MG/DL (ref 74–106)
GLUCOSE UR STRIP-MCNC: NORMAL MG/DL
HCT VFR BLD AUTO: 30.9 % (ref 38–47)
HGB BLD-MCNC: 10.1 G/DL (ref 12–16)
IMM GRANULOCYTES # BLD AUTO: 0.03 K/UL (ref 0–0.04)
IMM GRANULOCYTES NFR BLD: 0.4 % (ref 0–0.4)
KETONES UR STRIP-SCNC: NEGATIVE MG/DL
LEUKOCYTE ESTERASE UR QL STRIP: NEGATIVE
LYMPHOCYTES # BLD AUTO: 1.38 K/UL (ref 1–4.8)
LYMPHOCYTES NFR BLD AUTO: 16.7 % (ref 27–41)
MCH RBC QN AUTO: 30 PG (ref 27–31)
MCHC RBC AUTO-ENTMCNC: 32.7 G/DL (ref 32–36)
MCV RBC AUTO: 91.7 FL (ref 80–96)
MONOCYTES # BLD AUTO: 0.52 K/UL (ref 0–0.8)
MONOCYTES NFR BLD AUTO: 6.3 % (ref 2–6)
MPC BLD CALC-MCNC: 11.1 FL (ref 9.4–12.4)
NEUTROPHILS # BLD AUTO: 6.24 K/UL (ref 1.8–7.7)
NEUTROPHILS NFR BLD AUTO: 75.8 % (ref 53–65)
NITRITE UR QL STRIP: NEGATIVE
NRBC # BLD AUTO: 0 X10E3/UL
NRBC, AUTO (.00): 0 %
PH UR STRIP: 6.5 PH UNITS
PLATELET # BLD AUTO: 308 K/UL (ref 150–400)
POTASSIUM SERPL-SCNC: 3.5 MMOL/L (ref 3.5–5.1)
PROT SERPL-MCNC: 7.8 G/DL (ref 6.4–8.2)
PROT UR QL STRIP: NEGATIVE
RBC # BLD AUTO: 3.37 M/UL (ref 4.2–5.4)
RBC # UR STRIP: NEGATIVE /UL
SODIUM SERPL-SCNC: 138 MMOL/L (ref 136–145)
SP GR UR STRIP: 1.03
UROBILINOGEN UR STRIP-ACNC: 6 MG/DL
WBC # BLD AUTO: 8.24 K/UL (ref 4.5–11)

## 2024-10-14 PROCEDURE — 36415 COLL VENOUS BLD VENIPUNCTURE: CPT | Performed by: EMERGENCY MEDICINE

## 2024-10-14 PROCEDURE — 99285 EMERGENCY DEPT VISIT HI MDM: CPT

## 2024-10-14 PROCEDURE — 87040 BLOOD CULTURE FOR BACTERIA: CPT | Performed by: EMERGENCY MEDICINE

## 2024-10-14 PROCEDURE — 80053 COMPREHEN METABOLIC PANEL: CPT | Performed by: EMERGENCY MEDICINE

## 2024-10-14 PROCEDURE — 81003 URINALYSIS AUTO W/O SCOPE: CPT | Performed by: EMERGENCY MEDICINE

## 2024-10-14 PROCEDURE — 85025 COMPLETE CBC W/AUTO DIFF WBC: CPT | Performed by: EMERGENCY MEDICINE

## 2024-10-14 PROCEDURE — 25500020 PHARM REV CODE 255: Performed by: EMERGENCY MEDICINE

## 2024-10-14 RX ADMIN — IOPAMIDOL 100 ML: 755 INJECTION, SOLUTION INTRAVENOUS at 11:10

## 2024-10-14 NOTE — TELEPHONE ENCOUNTER
Patient called stating she is having a burning sensation when she urinates and is currently breast feeding. Pt has an appt on Wednesday. Informed pt that  is not in clinic until tomorrow. Patient can go to Immediate care to be seen or patient can increase water intake and try drinking cranberry juice till her next appt on Wednesday.   Informed pt I need a medical release and [place] consent paper signed to fill out her [place] paper work and Patient stated that her Doctor she was seeing at [facility] filed her FMLA paperwork for her and did not need any other papers filled out at this time.     Patient verbalized all understanding and will be here Wednesday for her appointment.

## 2024-10-15 VITALS
HEIGHT: 69 IN | HEART RATE: 93 BPM | OXYGEN SATURATION: 99 % | WEIGHT: 193 LBS | DIASTOLIC BLOOD PRESSURE: 78 MMHG | BODY MASS INDEX: 28.58 KG/M2 | TEMPERATURE: 99 F | RESPIRATION RATE: 18 BRPM | SYSTOLIC BLOOD PRESSURE: 117 MMHG

## 2024-10-15 RX ORDER — IOPAMIDOL 755 MG/ML
100 INJECTION, SOLUTION INTRAVASCULAR
Status: COMPLETED | OUTPATIENT
Start: 2024-10-15 | End: 2024-10-14

## 2024-10-15 NOTE — ED TRIAGE NOTES
"Pt presents cc of post-op  leg pain and "pulling" that started about a week ago. Pt states that she also woke up today and had chills in a cold room. Pt states she took a norco before coming and it hasn't touched the pain in her leg. She also states that she has burning upon urination for 5 days now.   "

## 2024-10-15 NOTE — ED PROVIDER NOTES
Encounter Date: 10/14/2024    SCRIBE #1 NOTE: I, Frances Fox, am scribing for, and in the presence of,  Will Leon MD. I have scribed the entire note.       History     Chief Complaint   Patient presents with    Post-op Problem     This is a 23 y/o female, who presents to the ED with complaints of lower abdominal pain which started around one week ago. She notes 2 weeks ago, she had a  via Dr. Barton. She also complains of right lower leg pain and weakness. She notes the leg is so weak she is unable to ambulate and has to move her leg with her arms. She notes she had a fever during triage and notes she had chills today. There is no Hx of a nausea or vomiting. PT also reports dysuria. She notes she has noticed when she urinates there is a burning type of pain.  There are no other complaints/pain in the ED at this time.     The history is provided by the patient. No  was used.     Review of patient's allergies indicates:   Allergen Reactions    Strawberries [strawberry] Swelling     itching     Past Medical History:   Diagnosis Date    Allergy     Asthma     Screening examination for STD (sexually transmitted disease) 2023     Past Surgical History:   Procedure Laterality Date     SECTION N/A 10/2/2024    Procedure:  SECTION;  Surgeon: Martha Barton MD;  Location: [facility] L&D;  Service: OB/GYN;  Laterality: N/A;    COLPOSCOPY, WITH BIOPSY OF CERVIX       Family History   Problem Relation Name Age of Onset    Diabetes Father      Diabetes Maternal Grandmother      No Known Problems Mother      No Known Problems Sister      No Known Problems Brother      Diabetes Maternal Aunt      No Known Problems Maternal Uncle      No Known Problems Paternal Aunt      Stroke Maternal Grandfather      No Known Problems Paternal Grandmother      No Known Problems Paternal Grandfather       Social History     Tobacco Use    Smoking status: Never      Passive exposure: Never    Smokeless tobacco: Never   Substance Use Topics    Alcohol use: Not Currently     Comment: occassional    Drug use: Not Currently     Types: Marijuana     Comment: occassional     Review of Systems   Constitutional:  Positive for chills and fever.   Gastrointestinal:  Positive for abdominal pain. Negative for nausea and vomiting.   Genitourinary:  Positive for dysuria.   All other systems reviewed and are negative.      Physical Exam     Initial Vitals [10/14/24 2032]   BP Pulse Resp Temp SpO2   116/79 104 18 100.1 °F (37.8 °C) 98 %      MAP       --         Physical Exam    Nursing note and vitals reviewed.  Constitutional: She appears well-developed and well-nourished.   HENT:   Head: Normocephalic and atraumatic.   Eyes: EOM are normal. Pupils are equal, round, and reactive to light.   Neck: Neck supple. No thyromegaly present.   Normal range of motion.  Cardiovascular:  Normal rate, regular rhythm, normal heart sounds and intact distal pulses.           No murmur heard.  Pulmonary/Chest: Breath sounds normal. She has no wheezes.   Abdominal: Abdomen is soft. Bowel sounds are normal. There is no abdominal tenderness.    incision appears to be clean and there is no sign of infection.      Musculoskeletal:         General: No tenderness or edema. Normal range of motion.      Cervical back: Normal range of motion and neck supple.     Lymphadenopathy:     She has no cervical adenopathy.   Neurological: She is alert and oriented to person, place, and time. She has normal strength and normal reflexes. No cranial nerve deficit or sensory deficit. GCS score is 15. GCS eye subscore is 4. GCS verbal subscore is 5. GCS motor subscore is 6.   Skin: Skin is warm and dry. Capillary refill takes less than 2 seconds. No rash noted.   Psychiatric: She has a normal mood and affect.         ED Course   Procedures  Labs Reviewed   COMPREHENSIVE METABOLIC PANEL - Abnormal       Result Value     Sodium 138      Potassium 3.5      Chloride 107      CO2 26      Anion Gap 9      Glucose 85      BUN 11      Creatinine 0.64      BUN/Creatinine Ratio 17      Calcium 9.5      Total Protein 7.8      Albumin 3.1 (*)     Globulin 4.7 (*)     A/G Ratio 0.7      Bilirubin, Total 0.3      Alk Phos 110 (*)     ALT 16      AST 15      eGFR 127     URINALYSIS, REFLEX TO URINE CULTURE - Abnormal    Color, UA Yellow      Clarity, UA Clear      pH, UA 6.5      Leukocytes, UA Negative      Nitrites, UA Negative      Protein, UA Negative      Glucose, UA Normal      Ketones, UA Negative      Urobilinogen, UA 6 (*)     Bilirubin, UA Negative      Blood, UA Negative      Specific Gravity, UA 1.032 (*)    CBC WITH DIFFERENTIAL - Abnormal    WBC 8.24      RBC 3.37 (*)     Hemoglobin 10.1 (*)     Hematocrit 30.9 (*)     MCV 91.7      MCH 30.0      MCHC 32.7      RDW 14.4      Platelet Count 308      MPV 11.1      Neutrophils % 75.8 (*)     Lymphocytes % 16.7 (*)     Monocytes % 6.3 (*)     Eosinophils % 0.6 (*)     Basophils % 0.2      Immature Granulocytes % 0.4      nRBC, Auto 0.0      Neutrophils, Abs 6.24      Lymphocytes, Absolute 1.38      Monocytes, Absolute 0.52      Eosinophils, Absolute 0.05      Basophils, Absolute 0.02      Immature Granulocytes, Absolute 0.03      nRBC, Absolute 0.00      Diff Type Auto     CULTURE, BLOOD    Culture, Blood No Growth At 48 Hours     CULTURE, BLOOD    Culture, Blood No Growth At 48 Hours     CBC W/ AUTO DIFFERENTIAL    Narrative:     The following orders were created for panel order CBC auto differential.  Procedure                               Abnormality         Status                     ---------                               -----------         ------                     CBC with Differential[[id number]]       Abnormal            Final result                 Please view results for these tests on the individual orders.          Imaging Results              CT Abdomen Pelvis With IV  Contrast NO Oral Contrast (Final result)  Result time 10/15/24 00:54:04      Final result by Petty Jara MD (10/15/24 00:54:04)                   Impression:      Mild constipation.    No acute abnormality in the abdomen or pelvis.      Electronically signed by: Petty Jara  Date:    10/15/2024  Time:    00:54               Narrative:    EXAMINATION:  CT ABDOMEN PELVIS WITH IV CONTRAST    CLINICAL HISTORY:  Abdominal pain, post-op;    TECHNIQUE:  Low dose axial images, sagittal and coronal reformations were obtained from the lung bases to the pubic symphysis following the IV administration of 100 mL of Omnipaque 350 .  Oral contrast was not administered.    COMPARISON:  None.    FINDINGS:  Abdomen:    - Lower thorax:Heart is not enlarged.  There is no pericardial effusion..    - Lung bases: No infiltrates and no nodules.    - Liver: Liver appears homogeneous.    - Gallbladder: There is no CT evidence of cholelithiasis or cholecystitis.    - Bile Ducts: No evidence of intra or extra hepatic biliary ductal dilation.    - Spleen: Negative.    - Kidneys: No mass or hydronephrosis.    - Adrenals: Unremarkable.    - Pancreas: No mass or peripancreatic fat stranding.    - Retroperitoneum:  No significant adenopathy.    - Vascular: No abdominal aortic aneurysm.    - Abdominal wall:  Unremarkable.    Pelvis:    Uterus appears heterogeneous suggesting myomas.  No adnexal masses or free fluid.  Bladder is unremarkable.    Bowel/Mesentery:    There is a mild stool burden constipation.  Appendix is unremarkable.  Small bowel is unremarkable.    Bones:  No acute osseous abnormality and no suspicious lytic or blastic lesion.                                       Medications   iopamidoL (ISOVUE-370) injection 100 mL (100 mLs Intravenous Given 10/14/24 4971)     Medical Decision Making  Amount and/or Complexity of Data Reviewed  Labs: ordered.  Radiology: ordered.    Risk  Prescription drug management.               Attending Attestation:           Physician Attestation for Scribe:  Physician Attestation Statement for Scribe #1: I, Will Ashley MD, reviewed documentation, as scribed by Frances Taylor in my presence, and it is both accurate and complete.             ED Course as of 10/18/24 0501   Mon Oct 14, 2024   2112 MDM:  A 24-year-old female patient who had  2 weeks ago came to the emergency department complaining of abdominal pain and dysuria.  Physical examination revealed suprapubic tenderness.  Differential diagnosis:  UTI, pyelonephritis, postop intra-abdominal abscess, psoas abscess [HK]   Tue Oct 15, 2024   0057 CT Abdomen Pelvis with IV Contrast NO Oral contrast:   Mild constipation.     No acute abnormality in the abdomen or pelvis.      [BW]      ED Course User Index  [BW] Frances Taylor  [HK] Will Ashley MD                             Clinical Impression:  Final diagnoses:  [G89.18] Post-op pain (Primary)  [R10.9] Abdominal pain, unspecified abdominal location          ED Disposition Condition    Discharge Stable          ED Prescriptions    None       Follow-up Information    None          Will Ashley MD  10/18/24 0501

## 2024-10-16 ENCOUNTER — OFFICE VISIT (OUTPATIENT)
Dept: OBSTETRICS AND GYNECOLOGY | Facility: CLINIC | Age: 24
End: 2024-10-16
Payer: MEDICAID

## 2024-10-16 VITALS
SYSTOLIC BLOOD PRESSURE: 134 MMHG | DIASTOLIC BLOOD PRESSURE: 70 MMHG | WEIGHT: 193 LBS | BODY MASS INDEX: 28.5 KG/M2 | HEART RATE: 106 BPM

## 2024-10-16 DIAGNOSIS — Z98.891 STATUS POST CESAREAN DELIVERY: Primary | ICD-10-CM

## 2024-10-16 NOTE — PROGRESS NOTES
Postpartum Visit  Connie Cook is a 24 y.o. female  is here for a postpartum visit. She is 2 weeks postpartum following a low cervical transverse  section, of two female infants weighin lb 4.1 oz and 4 lb 11.8 oz, with Anesthesia: spinal. . The delivery was at 35 w 2 d.     Pregnancy was complicated by: IUGR and twin pregnancy.        Postpartum course has been uncomplicated.  Bleeding staining only. Bowel/ bladder function is normal. Her last pap was 2023.  Patient is not sexually active. Desired contraception method is none.   Postpartum depression screening: negative.  Baby's course has been uncomplicated. Baby is feeding by both breast and bottle - Enfamil Nutramigen.     OB History    Para Term  AB Living   2 2   2   2   SAB IAB Ectopic Multiple Live Births         1 2      # Outcome Date GA Lbr Shaw/2nd Weight Sex Type Anes PTL Lv   2A  10/02/24 35w2d  2.148 kg (4 lb 11.8 oz) F CS-LTranv Spinal  DEBORAH   2B  10/02/24 35w2d  1.93 kg (4 lb 4.1 oz) F CS-LTranv Spinal  DEBORAH   1  20 20w0d  0.454 kg (1 lb) F Vag-Spont   FD      Obstetric Comments   1 miscarriage-Dec 2020  8 mo gestation/ vaginally delivered, early rupture of membranes         Review of patient's allergies indicates:   Allergen Reactions    Strawberries [strawberry] Swelling     itching       Past Medical History:   Diagnosis Date    Allergy     Asthma     Screening examination for STD (sexually transmitted disease) 2023     Past Surgical History:   Procedure Laterality Date     SECTION N/A 10/2/2024    Procedure:  SECTION;  Surgeon: Martha Barton MD;  Location: [facility] L&D;  Service: OB/GYN;  Laterality: N/A;    COLPOSCOPY, WITH BIOPSY OF CERVIX       OB History          2    Para   2    Term           2    AB        Living   2         SAB        IAB        Ectopic        Multiple   1    Live Births   2           Obstetric Comments   1  miscarriage-Dec 2020  8 mo gestation/ vaginally delivered, early rupture of membranes             Family History   Problem Relation Name Age of Onset    Diabetes Father      Diabetes Maternal Grandmother      No Known Problems Mother      No Known Problems Sister      No Known Problems Brother      Diabetes Maternal Aunt      No Known Problems Maternal Uncle      No Known Problems Paternal Aunt      Stroke Maternal Grandfather      No Known Problems Paternal Grandmother      No Known Problems Paternal Grandfather       Social History     Tobacco Use    Smoking status: Never     Passive exposure: Never    Smokeless tobacco: Never   Substance Use Topics    Alcohol use: Not Currently     Comment: occassional    Drug use: Not Currently     Types: Marijuana     Comment: occassional       Current Outpatient Medications   Medication Sig    ferrous sulfate 325 (65 FE) MG EC tablet Take 1 tablet (325 mg total) by mouth 3 (three) times daily with meals.    HYDROcodone-acetaminophen (NORCO) 7.5-325 mg per tablet Take 1 tablet by mouth every 6 (six) hours as needed for Pain.    PNV,calcium 72-iron-folic acid (PRENATAL VITAMIN PLUS LOW IRON) 27 mg iron- 1 mg Tab Take 1 tablet (1 each total) by mouth once daily.     No current facility-administered medications for this visit.         ROS:  GENERAL: No fever, chills, fatigability.  VULVAR: No pain, no lesions and no itching.  VAGINAL: No relaxation, no itching, no discharge, no abnormal bleeding and no lesions.  ABDOMEN: No abdominal pain. Denies nausea. Denies vomiting. No diarrhea. No constipation  BREAST: Denies pain. No lumps. No discharge.  URINARY: No incontinence, no nocturia, no frequency and no dysuria.  CARDIOVASCULAR: No chest pain. No shortness of breath. No leg cramps.  NEUROLOGICAL: No headaches. No vision changes.      General appearance - alert, well appearing, and in no distress  Mental status - alert, oriented to person, place, and time  Skin - coloration normal  for race, good turgor, warm to touch, no rashes  Abdomen - soft, nontender, nondistended, no masses or organomegaly  Pfannenstiel incision: Clean, dry, intact - healing well.  Pelvic -   External genitalia postpartum: normal, well-healed, without lesions or masses.  Normal female hair distribution. Adequate perineal body. Urethral meatus without lesions or prolapse. Urethra: no masses, tenderness, or scarring.  Bladder: without tenderness or masses.  Vaginal mucosa moist and pink, normal rugae, without lesions, abnormal discharge, or foul odor.  Cervix pink, no lesions, no cervical motion tenderness.  Uterus: midline, non tender, smooth, not enlarged, not prolapsed  No adnexal masses or tenderness.  Extremities - no edema, redness or tenderness in the calves or thighs      There are no diagnoses linked to this encounter.    Discussed contraception - pt desires undecided  Counseling regarding resuming normal activities of exercise and work.  Postpartum precautions reviewed    Routine follow up in 1 yr     normal behavior/normal affect

## 2024-10-19 LAB
BACTERIA BLD CULT: NORMAL
BACTERIA BLD CULT: NORMAL

## 2024-10-20 LAB
BACTERIA BLD CULT: NORMAL
BACTERIA BLD CULT: NORMAL

## 2024-10-23 ENCOUNTER — PATIENT MESSAGE (OUTPATIENT)
Dept: OBSTETRICS AND GYNECOLOGY | Facility: CLINIC | Age: 24
End: 2024-10-23
Payer: MEDICAID

## 2024-11-07 ENCOUNTER — HOSPITAL ENCOUNTER (EMERGENCY)
Facility: HOSPITAL | Age: 24
Discharge: HOME | End: 2024-11-07
Attending: EMERGENCY MEDICINE
Payer: MEDICAID

## 2024-11-07 VITALS
RESPIRATION RATE: 18 BRPM | HEIGHT: 69 IN | HEART RATE: 99 BPM | OXYGEN SATURATION: 99 % | WEIGHT: 192 LBS | BODY MASS INDEX: 28.44 KG/M2 | TEMPERATURE: 99 F | SYSTOLIC BLOOD PRESSURE: 122 MMHG | DIASTOLIC BLOOD PRESSURE: 79 MMHG

## 2024-11-07 DIAGNOSIS — J06.9 VIRAL URI WITH COUGH: Primary | ICD-10-CM

## 2024-11-07 LAB
INFLUENZA A MOLECULAR (OHS): NEGATIVE
INFLUENZA B MOLECULAR (OHS): NEGATIVE
SARS-COV-2 RDRP RESP QL NAA+PROBE: NEGATIVE

## 2024-11-07 PROCEDURE — 99284 EMERGENCY DEPT VISIT MOD MDM: CPT

## 2024-11-07 PROCEDURE — 87502 INFLUENZA DNA AMP PROBE: CPT | Performed by: EMERGENCY MEDICINE

## 2024-11-07 PROCEDURE — 87635 SARS-COV-2 COVID-19 AMP PRB: CPT | Performed by: EMERGENCY MEDICINE

## 2024-11-07 RX ORDER — BENZONATATE 200 MG/1
200 CAPSULE ORAL 3 TIMES DAILY PRN
Qty: 30 CAPSULE | Refills: 0 | Status: SHIPPED | OUTPATIENT
Start: 2024-11-07 | End: 2024-11-17

## 2024-11-07 RX ORDER — FLUTICASONE PROPIONATE 50 MCG
1 SPRAY, SUSPENSION (ML) NASAL 2 TIMES DAILY
Qty: 15 G | Refills: 0 | Status: SHIPPED | OUTPATIENT
Start: 2024-11-07

## 2024-11-07 NOTE — DISCHARGE INSTRUCTIONS
USE FLONASE TWICE DAILY AND TAKE TESSALON AS DIRECTED.  TAKE IBUPROFEN FOR FEVER AND / OR BODY ACHES.  FOLLOW UP WITH YOUR PRIMARY CARE PROVIDER OR RETURN TO THE EMERGENCY DEPARTMENT IF SYMPTOMS PERSIST OF WORSEN OR OTHERWISE AS NEEDED.

## 2024-11-07 NOTE — ED PROVIDER NOTES
Encounter Date: 2024       History     Chief Complaint   Patient presents with    Generalized Body Aches    Cough    Sore Throat    Nasal Congestion     23 Y/O FEMALE COMPLAINING OF SORE THROAT, BODY ACHES, COUGH, NASAL CONGESTION THAT STARTED YESTERDAY.  SHE DOES NOT THINK THAT SHE HAS HAD FEVER.          Review of patient's allergies indicates:   Allergen Reactions    Strawberries [strawberry] Swelling     itching     Past Medical History:   Diagnosis Date    Allergy     Asthma     Screening examination for STD (sexually transmitted disease) 2023     Past Surgical History:   Procedure Laterality Date     SECTION N/A 10/2/2024    Procedure:  SECTION;  Surgeon: Martha Barton MD;  Location: [facility] L&D;  Service: OB/GYN;  Laterality: N/A;    COLPOSCOPY, WITH BIOPSY OF CERVIX       Family History   Problem Relation Name Age of Onset    Diabetes Father      Diabetes Maternal Grandmother      No Known Problems Mother      No Known Problems Sister      No Known Problems Brother      Diabetes Maternal Aunt      No Known Problems Maternal Uncle      No Known Problems Paternal Aunt      Stroke Maternal Grandfather      No Known Problems Paternal Grandmother      No Known Problems Paternal Grandfather       Social History     Tobacco Use    Smoking status: Never     Passive exposure: Never    Smokeless tobacco: Never   Substance Use Topics    Alcohol use: Not Currently     Comment: occassional    Drug use: Not Currently     Types: Marijuana     Comment: occassional     Review of Systems   All other systems reviewed and are negative.      Physical Exam     Initial Vitals [24 0305]   BP Pulse Resp Temp SpO2   122/79 99 18 98.7 °F (37.1 °C) 99 %      MAP       --         Physical Exam    Nursing note and vitals reviewed.  Constitutional: She appears well-developed and well-nourished.   HENT:   Head: Normocephalic and atraumatic.   NASAL MUCOSAL EDEMA.  POST-NASAL DRAINAGE.      Eyes: Conjunctivae and EOM are normal. Pupils are equal, round, and reactive to light.   Neck: Neck supple.   Normal range of motion.  Cardiovascular:  Normal rate, regular rhythm and normal heart sounds.           Pulmonary/Chest: Breath sounds normal.   Abdominal: Abdomen is soft. Bowel sounds are normal.   Musculoskeletal:         General: Normal range of motion.      Cervical back: Normal range of motion and neck supple.     Neurological: She is alert and oriented to person, place, and time. She has normal strength. GCS score is 15. GCS eye subscore is 4. GCS verbal subscore is 5. GCS motor subscore is 6.   Skin: Skin is warm and dry. Capillary refill takes less than 2 seconds.         Medical Screening Exam   See Full Note    ED Course   Procedures  Labs Reviewed   INFLUENZA A & B BY MOLECULAR - Normal       Result Value    INFLUENZA A MOLECULAR Negative      INFLUENZA B MOLECULAR  Negative     SARS-COV-2 RNA AMPLIFICATION, QUAL - Normal    SARS COV-2 Molecular Negative      Narrative:     Negative SARS-CoV results should not be used as the sole basis for treatment or patient management decisions; negative results should be considered in the context of a patient's recent exposures, history and the presene of clinical signs and symptoms consistent with COVID-19.  Negative results should be treated as presumptive and confirmed by molecular assay, if necessary for patient management.          Imaging Results    None          Medications - No data to display  Medical Decision Making                                    Clinical Impression:   Final diagnoses:  [J06.9] Viral URI with cough (Primary)        ED Disposition Condition    Discharge Stable          ED Prescriptions       Medication Sig Dispense Start Date End Date Auth. Provider    fluticasone propionate (FLONASE) 50 mcg/actuation nasal spray 1 spray (50 mcg total) by Each Nostril route 2 (two) times daily. Patient not taking:  Reported on 11/13/2024 15 g  2024 -- Richie Begum MD    benzonatate (TESSALON) 200 MG capsule () Take 1 capsule (200 mg total) by mouth 3 (three) times daily as needed for Cough. Patient not taking:  Reported on 2024 30 capsule 2024 Richie Begum MD          Follow-up Information       Follow up With Specialties Details Why Contact Info    Lavern Rapp, FNP-C Family Medicine  As needed [address] [place] [id number]  [phone]               Richie Begum MD  24

## 2024-11-13 ENCOUNTER — OFFICE VISIT (OUTPATIENT)
Dept: OBSTETRICS AND GYNECOLOGY | Facility: CLINIC | Age: 24
End: 2024-11-13
Payer: MEDICAID

## 2024-11-13 VITALS
BODY MASS INDEX: 28.91 KG/M2 | DIASTOLIC BLOOD PRESSURE: 57 MMHG | WEIGHT: 195.81 LBS | SYSTOLIC BLOOD PRESSURE: 99 MMHG | HEART RATE: 75 BPM

## 2024-11-13 DIAGNOSIS — Z30.011 ENCOUNTER FOR INITIAL PRESCRIPTION OF CONTRACEPTIVE PILLS: Primary | ICD-10-CM

## 2024-11-13 PROCEDURE — 3078F DIAST BP <80 MM HG: CPT | Mod: CPTII,,, | Performed by: OBSTETRICS & GYNECOLOGY

## 2024-11-13 PROCEDURE — 3074F SYST BP LT 130 MM HG: CPT | Mod: CPTII,,, | Performed by: OBSTETRICS & GYNECOLOGY

## 2024-11-13 PROCEDURE — 3044F HG A1C LEVEL LT 7.0%: CPT | Mod: CPTII,,, | Performed by: OBSTETRICS & GYNECOLOGY

## 2024-11-13 PROCEDURE — 1159F MED LIST DOCD IN RCRD: CPT | Mod: CPTII,,, | Performed by: OBSTETRICS & GYNECOLOGY

## 2024-11-13 PROCEDURE — 3008F BODY MASS INDEX DOCD: CPT | Mod: CPTII,,, | Performed by: OBSTETRICS & GYNECOLOGY

## 2024-11-13 PROCEDURE — 0503F POSTPARTUM CARE VISIT: CPT | Mod: ,,, | Performed by: OBSTETRICS & GYNECOLOGY

## 2024-11-13 RX ORDER — NORETHINDRONE ACETATE AND ETHINYL ESTRADIOL .02; 1 MG/1; MG/1
1 TABLET ORAL DAILY
Qty: 30 TABLET | Refills: 11 | Status: SHIPPED | OUTPATIENT
Start: 2024-11-13 | End: 2025-11-13

## 2024-11-13 NOTE — PROGRESS NOTES
Postpartum Visit  Connie Cook is a 24 y.o. female  is here for a postpartum visit. She is 6 weeks postpartum following a low cervical transverse  section, of twin female infants weighin lb 11 oz and 4 lbs 4 oz, with Anesthesia: spinal. . The delivery was at 35 w 2 d.     Pregnancy was complicated by: twin gestation with IUGR.        Postpartum course has been uncomplicated.  Bleeding moderate lochia. Bowel/ bladder function is normal. Her last pap was 2023.  Patient is not sexually active. Desired contraception method is OCP (estrogen/progesterone) and oral progesterone-only contraceptive.   Postpartum depression screening: negative.  Baby's course has been uncomplicated. Baby is feeding by breast.     OB History    Para Term  AB Living   2 2   2   2   SAB IAB Ectopic Multiple Live Births         1 2      # Outcome Date GA Lbr Shaw/2nd Weight Sex Type Anes PTL Lv   2A  10/02/24 35w2d  2.148 kg (4 lb 11.8 oz) F CS-LTranv Spinal  DEBORAH   2B  10/02/24 35w2d  1.93 kg (4 lb 4.1 oz) F CS-LTranv Spinal  DEBORAH   1  20 20w0d  0.454 kg (1 lb) F Vag-Spont   FD      Obstetric Comments   1 miscarriage-Dec 2020  8 mo gestation/ vaginally delivered, early rupture of membranes         Review of patient's allergies indicates:   Allergen Reactions    Strawberries [strawberry] Swelling     itching       Past Medical History:   Diagnosis Date    Allergy     Asthma     Screening examination for STD (sexually transmitted disease) 2023     Past Surgical History:   Procedure Laterality Date     SECTION N/A 10/2/2024    Procedure:  SECTION;  Surgeon: Martha Barton MD;  Location: [facility] L&D;  Service: OB/GYN;  Laterality: N/A;    COLPOSCOPY, WITH BIOPSY OF CERVIX       OB History          2    Para   2    Term           2    AB        Living   2         SAB        IAB        Ectopic        Multiple   1    Live Births   2            Obstetric Comments   1 miscarriage-Dec 2020  8 mo gestation/ vaginally delivered, early rupture of membranes             Family History   Problem Relation Name Age of Onset    Diabetes Father      Diabetes Maternal Grandmother      No Known Problems Mother      No Known Problems Sister      No Known Problems Brother      Diabetes Maternal Aunt      No Known Problems Maternal Uncle      No Known Problems Paternal Aunt      Stroke Maternal Grandfather      No Known Problems Paternal Grandmother      No Known Problems Paternal Grandfather       Social History     Tobacco Use    Smoking status: Never     Passive exposure: Never    Smokeless tobacco: Never   Substance Use Topics    Alcohol use: Not Currently     Comment: occassional    Drug use: Not Currently     Types: Marijuana     Comment: occassional       Current Outpatient Medications   Medication Sig    ferrous sulfate 325 (65 FE) MG EC tablet Take 1 tablet (325 mg total) by mouth 3 (three) times daily with meals.    PNV,calcium 72-iron-folic acid (PRENATAL VITAMIN PLUS LOW IRON) 27 mg iron- 1 mg Tab Take 1 tablet (1 each total) by mouth once daily.    benzonatate (TESSALON) 200 MG capsule Take 1 capsule (200 mg total) by mouth 3 (three) times daily as needed for Cough. (Patient not taking: Reported on 11/13/2024)    fluticasone propionate (FLONASE) 50 mcg/actuation nasal spray 1 spray (50 mcg total) by Each Nostril route 2 (two) times daily. (Patient not taking: Reported on 11/13/2024)    HYDROcodone-acetaminophen (NORCO) 7.5-325 mg per tablet Take 1 tablet by mouth every 6 (six) hours as needed for Pain. (Patient not taking: Reported on 11/13/2024)     No current facility-administered medications for this visit.         ROS:  GENERAL: No fever, chills, fatigability.  VULVAR: No pain, no lesions and no itching.  VAGINAL: No relaxation, no itching, no discharge, no abnormal bleeding and no lesions.  ABDOMEN: No abdominal pain. Denies nausea. Denies vomiting.  No diarrhea. No constipation  BREAST: Denies pain. No lumps. No discharge.  URINARY: No incontinence, no nocturia, no frequency and no dysuria.  CARDIOVASCULAR: No chest pain. No shortness of breath. No leg cramps.  NEUROLOGICAL: No headaches. No vision changes.      General appearance - alert, well appearing, and in no distress  Mental status - alert, oriented to person, place, and time  Skin - coloration normal for race, good turgor, warm to touch, no rashes  Abdomen - soft, nontender, nondistended, no masses or organomegaly    Pfannenstiel incision: Clean, dry, intact - healing well.  Pelvic -   External genitalia postpartum: normal, well-healed, without lesions or masses.  Normal female hair distribution. Adequate perineal body. Urethral meatus without lesions or prolapse. Urethra: no masses, tenderness, or scarring.  Bladder: without tenderness or masses.  Vaginal mucosa moist and pink, normal rugae, without lesions, abnormal discharge, or foul odor.  Cervix pink, no lesions, no cervical motion tenderness.  Uterus: midline, non tender, smooth, not enlarged, not prolapsed  No adnexal masses or tenderness.  Extremities - no edema, redness or tenderness in the calves or thighs      There are no diagnoses linked to this encounter.    Discussed contraception - pt desires OCP  Counseling regarding resuming normal activities of exercise and work.  Postpartum precautions reviewed    Routine follow up in 1 yr

## 2025-01-05 ENCOUNTER — HOSPITAL ENCOUNTER (EMERGENCY)
Facility: HOSPITAL | Age: 25
Discharge: HOME | End: 2025-01-05
Payer: MEDICAID

## 2025-01-05 VITALS
HEART RATE: 76 BPM | RESPIRATION RATE: 16 BRPM | SYSTOLIC BLOOD PRESSURE: 123 MMHG | WEIGHT: 200 LBS | HEIGHT: 69 IN | DIASTOLIC BLOOD PRESSURE: 79 MMHG | TEMPERATURE: 98 F | BODY MASS INDEX: 29.62 KG/M2 | OXYGEN SATURATION: 100 %

## 2025-01-05 DIAGNOSIS — K21.9 GASTROESOPHAGEAL REFLUX DISEASE, UNSPECIFIED WHETHER ESOPHAGITIS PRESENT: Primary | ICD-10-CM

## 2025-01-05 DIAGNOSIS — R19.7 DIARRHEA, UNSPECIFIED TYPE: ICD-10-CM

## 2025-01-05 DIAGNOSIS — R07.9 CHEST PAIN: ICD-10-CM

## 2025-01-05 LAB
ALBUMIN SERPL BCP-MCNC: 3.3 G/DL (ref 3.5–5)
ALBUMIN/GLOB SERPL: 0.8 {RATIO}
ALP SERPL-CCNC: 67 U/L (ref 40–150)
ALT SERPL W P-5'-P-CCNC: 13 U/L
ANION GAP SERPL CALCULATED.3IONS-SCNC: 10 MMOL/L (ref 7–16)
AST SERPL W P-5'-P-CCNC: 22 U/L (ref 5–34)
B-HCG UR QL: NEGATIVE
BASOPHILS # BLD AUTO: 0.03 K/UL (ref 0–0.2)
BASOPHILS NFR BLD AUTO: 0.7 % (ref 0–1)
BILIRUB SERPL-MCNC: 0.2 MG/DL
BILIRUB UR QL STRIP: NEGATIVE
BUN SERPL-MCNC: 9 MG/DL (ref 7–19)
BUN/CREAT SERPL: 12 (ref 6–20)
CALCIUM SERPL-MCNC: 8.5 MG/DL (ref 8.4–10.2)
CHLORIDE SERPL-SCNC: 106 MMOL/L (ref 98–107)
CLARITY UR: CLEAR
CO2 SERPL-SCNC: 25 MMOL/L (ref 22–29)
COLOR UR: ABNORMAL
CREAT SERPL-MCNC: 0.78 MG/DL (ref 0.55–1.02)
CTP QC/QA: YES
DIFFERENTIAL METHOD BLD: ABNORMAL
EGFR (NO RACE VARIABLE) (RUSH/TITUS): 109 ML/MIN/1.73M2
EOSINOPHIL # BLD AUTO: 0.1 K/UL (ref 0–0.5)
EOSINOPHIL NFR BLD AUTO: 2.3 % (ref 1–4)
ERYTHROCYTE [DISTWIDTH] IN BLOOD BY AUTOMATED COUNT: 13.5 % (ref 11.5–14.5)
GLOBULIN SER-MCNC: 4.4 G/DL (ref 2–4)
GLUCOSE SERPL-MCNC: 102 MG/DL (ref 74–100)
GLUCOSE UR STRIP-MCNC: NORMAL MG/DL
HCT VFR BLD AUTO: 34.4 % (ref 38–47)
HGB BLD-MCNC: 11 G/DL (ref 12–16)
IMM GRANULOCYTES # BLD AUTO: 0.01 K/UL (ref 0–0.04)
IMM GRANULOCYTES NFR BLD: 0.2 % (ref 0–0.4)
INFLUENZA A MOLECULAR (OHS): NEGATIVE
INFLUENZA B MOLECULAR (OHS): NEGATIVE
KETONES UR STRIP-SCNC: NEGATIVE MG/DL
LEUKOCYTE ESTERASE UR QL STRIP: NEGATIVE
LYMPHOCYTES # BLD AUTO: 1.69 K/UL (ref 1–4.8)
LYMPHOCYTES NFR BLD AUTO: 39.6 % (ref 27–41)
MCH RBC QN AUTO: 28.5 PG (ref 27–31)
MCHC RBC AUTO-ENTMCNC: 32 G/DL (ref 32–36)
MCV RBC AUTO: 89.1 FL (ref 80–96)
MONOCYTES # BLD AUTO: 0.38 K/UL (ref 0–0.8)
MONOCYTES NFR BLD AUTO: 8.9 % (ref 2–6)
MPC BLD CALC-MCNC: 11.3 FL (ref 9.4–12.4)
NEUTROPHILS # BLD AUTO: 2.06 K/UL (ref 1.8–7.7)
NEUTROPHILS NFR BLD AUTO: 48.3 % (ref 53–65)
NITRITE UR QL STRIP: NEGATIVE
NRBC # BLD AUTO: 0 X10E3/UL
NRBC, AUTO (.00): 0 %
PH UR STRIP: 6.5 PH UNITS
PLATELET # BLD AUTO: 300 K/UL (ref 150–400)
POTASSIUM SERPL-SCNC: 4 MMOL/L (ref 3.5–5.1)
PROT SERPL-MCNC: 7.7 G/DL (ref 6.4–8.3)
PROT UR QL STRIP: NEGATIVE
RBC # BLD AUTO: 3.86 M/UL (ref 4.2–5.4)
RBC # UR STRIP: NEGATIVE /UL
SARS-COV-2 RDRP RESP QL NAA+PROBE: NEGATIVE
SODIUM SERPL-SCNC: 137 MMOL/L (ref 136–145)
SP GR UR STRIP: 1.03
UROBILINOGEN UR STRIP-ACNC: 3 MG/DL
WBC # BLD AUTO: 4.27 K/UL (ref 4.5–11)

## 2025-01-05 PROCEDURE — 85025 COMPLETE CBC W/AUTO DIFF WBC: CPT | Performed by: NURSE PRACTITIONER

## 2025-01-05 PROCEDURE — 99283 EMERGENCY DEPT VISIT LOW MDM: CPT | Mod: 25

## 2025-01-05 PROCEDURE — 87502 INFLUENZA DNA AMP PROBE: CPT | Performed by: NURSE PRACTITIONER

## 2025-01-05 PROCEDURE — 87635 SARS-COV-2 COVID-19 AMP PRB: CPT | Performed by: NURSE PRACTITIONER

## 2025-01-05 PROCEDURE — 93005 ELECTROCARDIOGRAM TRACING: CPT

## 2025-01-05 PROCEDURE — 25000003 PHARM REV CODE 250: Performed by: NURSE PRACTITIONER

## 2025-01-05 PROCEDURE — 81025 URINE PREGNANCY TEST: CPT | Performed by: NURSE PRACTITIONER

## 2025-01-05 PROCEDURE — 36415 COLL VENOUS BLD VENIPUNCTURE: CPT | Performed by: NURSE PRACTITIONER

## 2025-01-05 PROCEDURE — 80053 COMPREHEN METABOLIC PANEL: CPT | Performed by: NURSE PRACTITIONER

## 2025-01-05 PROCEDURE — 81003 URINALYSIS AUTO W/O SCOPE: CPT | Performed by: NURSE PRACTITIONER

## 2025-01-05 RX ORDER — FAMOTIDINE 20 MG/1
20 TABLET, FILM COATED ORAL 2 TIMES DAILY
Qty: 20 TABLET | Refills: 0 | Status: SHIPPED | OUTPATIENT
Start: 2025-01-05 | End: 2026-01-05

## 2025-01-05 RX ORDER — LIDOCAINE HYDROCHLORIDE 20 MG/ML
15 SOLUTION OROPHARYNGEAL ONCE
Status: COMPLETED | OUTPATIENT
Start: 2025-01-05 | End: 2025-01-05

## 2025-01-05 RX ORDER — ALUMINUM HYDROXIDE, MAGNESIUM HYDROXIDE, AND SIMETHICONE 1200; 120; 1200 MG/30ML; MG/30ML; MG/30ML
30 SUSPENSION ORAL ONCE
Status: COMPLETED | OUTPATIENT
Start: 2025-01-05 | End: 2025-01-05

## 2025-01-05 RX ADMIN — LIDOCAINE HYDROCHLORIDE 15 ML: 20 SOLUTION ORAL at 01:01

## 2025-01-05 RX ADMIN — ALUMINUM HYDROXIDE, MAGNESIUM HYDROXIDE, AND SIMETHICONE 30 ML: 200; 200; 20 SUSPENSION ORAL at 01:01

## 2025-01-05 NOTE — ED PROVIDER NOTES
"Encounter Date: 2025       History     Chief Complaint   Patient presents with    Abdominal Pain     A couple days ago    Diarrhea     Last bowel movement "A few minutes ago"    Chest Pain     X a "couple mins ago"  midsternal     Patient is a 24-year-old  female who presents to the emergency department with complaint of chest pain, abdominal pain, diarrhea, cough, congestion for a few days.  Kids have similar symptoms, RSV. Denies constipation, nausea, vomiting, or fever.      Review of patient's allergies indicates:   Allergen Reactions    Strawberries [strawberry] Swelling     itching     Past Medical History:   Diagnosis Date    Allergy     Asthma     Screening examination for STD (sexually transmitted disease) 2023     Past Surgical History:   Procedure Laterality Date     SECTION N/A 10/2/2024    Procedure:  SECTION;  Surgeon: Martha Barton MD;  [facility]: [facility] L&D;  Service: OB/GYN;  Laterality: N/A;    COLPOSCOPY, WITH BIOPSY OF CERVIX       Family History   Problem Relation Name Age of Onset    Diabetes Father      Diabetes Maternal Grandmother      No Known Problems Mother      No Known Problems Sister      No Known Problems Brother      Diabetes Maternal Aunt      No Known Problems Maternal Uncle      No Known Problems Paternal Aunt      Stroke Maternal Grandfather      No Known Problems Paternal Grandmother      No Known Problems Paternal Grandfather       Social History     Tobacco Use    Smoking status: Never     Passive exposure: Never    Smokeless tobacco: Never   Substance Use Topics    Alcohol use: Not Currently     Comment: occassional    Drug use: Not Currently     Types: Marijuana     Comment: occassional     Review of Systems   All other systems reviewed and are negative.      Physical Exam     Initial Vitals   BP Pulse Resp Temp SpO2   25 1240 25 1240 25 1243 25 1240 25 1240   123/79 76 16 97.7 °F (36.5 °C) " 100 %      MAP       --                Physical Exam    Constitutional: She appears well-developed and well-nourished. She is not diaphoretic. No distress.   HENT:   Head: Normocephalic and atraumatic.   Eyes: Pupils are equal, round, and reactive to light.   Neck: Neck supple.   Cardiovascular:  Normal rate.           Pulmonary/Chest: Breath sounds normal. No respiratory distress.   Abdominal: Abdomen is soft. She exhibits no distension. There is no abdominal tenderness. There is no rebound and no guarding.   Musculoskeletal:      Cervical back: Neck supple.     Neurological: She is alert and oriented to person, place, and time. GCS score is 15. GCS eye subscore is 4. GCS verbal subscore is 5. GCS motor subscore is 6.   Skin: Skin is warm and dry.   Psychiatric: She has a normal mood and affect. Her behavior is normal. Judgment and thought content normal.         Medical Screening Exam   See Full Note    ED Course   Procedures  Labs Reviewed   URINALYSIS, REFLEX TO URINE CULTURE - Abnormal       Result Value    Color, UA Light-Yellow      Clarity, UA Clear      pH, UA 6.5      Leukocytes, UA Negative      Nitrites, UA Negative      Protein, UA Negative      Glucose, UA Normal      Ketones, UA Negative      Urobilinogen, UA 3 (*)     Bilirubin, UA Negative      Blood, UA Negative      Specific Gravity, UA 1.027     COMPREHENSIVE METABOLIC PANEL - Abnormal    Sodium 137      Potassium 4.0      Chloride 106      CO2 25      Anion Gap 10      Glucose 102 (*)     BUN 9      Creatinine 0.78      BUN/Creatinine Ratio 12      Calcium 8.5      Total Protein 7.7      Albumin 3.3 (*)     Globulin 4.4 (*)     A/G Ratio 0.8      Bilirubin, Total 0.2      Alk Phos 67      ALT 13      AST 22      eGFR 109     CBC WITH DIFFERENTIAL - Abnormal    WBC 4.27 (*)     RBC 3.86 (*)     Hemoglobin 11.0 (*)     Hematocrit 34.4 (*)     MCV 89.1      MCH 28.5      MCHC 32.0      RDW 13.5      Platelet Count 300      MPV 11.3      Neutrophils  % 48.3 (*)     Lymphocytes % 39.6      Monocytes % 8.9 (*)     Eosinophils % 2.3      Basophils % 0.7      Immature Granulocytes % 0.2      nRBC, Auto 0.0      Neutrophils, Abs 2.06      Lymphocytes, Absolute 1.69      Monocytes, Absolute 0.38      Eosinophils, Absolute 0.10      Basophils, Absolute 0.03      Immature Granulocytes, Absolute 0.01      nRBC, Absolute 0.00      Diff Type Auto     INFLUENZA A & B BY MOLECULAR - Normal    INFLUENZA A MOLECULAR Negative      INFLUENZA B MOLECULAR  Negative     SARS-COV-2 RNA AMPLIFICATION, QUAL - Normal    SARS COV-2 Molecular Negative      Narrative:     Negative SARS-CoV results should not be used as the sole basis for treatment or patient management decisions; negative results should be considered in the context of a patient's recent exposures, history and the presene of clinical signs and symptoms consistent with COVID-19.  Negative results should be treated as presumptive and confirmed by molecular assay, if necessary for patient management.   CBC W/ AUTO DIFFERENTIAL    Narrative:     The following orders were created for panel order CBC auto differential.  Procedure                               Abnormality         Status                     ---------                               -----------         ------                     CBC with Differential[[id number]]       Abnormal            Final result                 Please view results for these tests on the individual orders.   POCT URINE PREGNANCY    POC Preg Test, Ur Negative       Acceptable Yes            Imaging Results    None          Medications   aluminum-magnesium hydroxide-simethicone 200-200-20 mg/5 mL suspension 30 mL (30 mLs Oral Given 1/5/25 1303)     And   LIDOcaine viscous HCl 2% oral solution 15 mL (15 mLs Oral Given 1/5/25 1303)     Medical Decision Making  25 y/o AAF, CP, abd pain, cough, congestion, diarrhea for a couple of days.    1433- Labs unremarkable.  Urine/swabs negative.   Updated patient on all test results.  Her chest and abdominal pain have completely resolved after GI cocktail.  She desires discharge. I will prescribe her some Pepcid and she will follow up with her primary care provider if symptoms continue.  She will return to the ER if worse in any way.    Amount and/or Complexity of Data Reviewed  Labs: ordered.    Risk  OTC drugs.  Prescription drug management.                                      Clinical Impression:   Final diagnoses:  [R07.9] Chest pain  [K21.9] Gastroesophageal reflux disease, unspecified whether esophagitis present (Primary)  [R19.7] Diarrhea, unspecified type        ED Disposition Condition    Discharge Stable          ED Prescriptions       Medication Sig Dispense Start Date End Date Auth. Provider    famotidine (PEPCID) 20 MG tablet Take 1 tablet (20 mg total) by mouth 2 (two) times daily. 20 tablet 1/5/2025 1/5/2026 Karli Craig FNP          Follow-up Information       Follow up With Specialties Details Why Contact Info    Lavern Rapp FNP-C Family Medicine Call in 2 days  [address] [facility] [address]  [phone]      [facility] - Emergency Department Emergency Medicine  As needed, If symptoms worsen [address] [facility] [address] [phone]             Karli Craig FNP  01/05/25 1437       Karli Craig FNP  01/05/25 1448

## 2025-01-05 NOTE — DISCHARGE INSTRUCTIONS
Take prescription as directed.  Follow-up with your primary care provider in 2 days for recheck.  Return to the ER for new or worsening symptoms.

## 2025-02-05 ENCOUNTER — OFFICE VISIT (OUTPATIENT)
Dept: OBSTETRICS AND GYNECOLOGY | Facility: CLINIC | Age: 25
End: 2025-02-05
Payer: MEDICAID

## 2025-02-05 VITALS
SYSTOLIC BLOOD PRESSURE: 132 MMHG | HEART RATE: 82 BPM | DIASTOLIC BLOOD PRESSURE: 75 MMHG | RESPIRATION RATE: 18 BRPM | WEIGHT: 207.38 LBS | BODY MASS INDEX: 30.72 KG/M2 | HEIGHT: 69 IN

## 2025-02-05 DIAGNOSIS — Z11.3 SCREEN FOR STD (SEXUALLY TRANSMITTED DISEASE): ICD-10-CM

## 2025-02-05 DIAGNOSIS — Z72.51 HIGH RISK SEXUAL BEHAVIOR, UNSPECIFIED TYPE: ICD-10-CM

## 2025-02-05 DIAGNOSIS — Z01.419 ROUTINE GYNECOLOGICAL EXAMINATION: ICD-10-CM

## 2025-02-05 DIAGNOSIS — Z11.4 ENCOUNTER FOR SCREENING FOR HIV: ICD-10-CM

## 2025-02-05 DIAGNOSIS — Z30.011 ENCOUNTER FOR INITIAL PRESCRIPTION OF CONTRACEPTIVE PILLS: ICD-10-CM

## 2025-02-05 DIAGNOSIS — Z12.4 CERVICAL CANCER SCREENING: Primary | ICD-10-CM

## 2025-02-05 LAB
BACTERIAL VAGINOSIS DNA (OHS): NEGATIVE
CANDIDA GLABRATA/KRUSEI DNA (OHS): NOT DETECTED
CANDIDA SPECIES DNA (OHS): NOT DETECTED
CHLAMYDIA BY PCR: NEGATIVE
N. GONORRHOEAE (GC) BY PCR: NEGATIVE
TRICHOMONAS VAGINALIS DNA (OHS): NOT DETECTED

## 2025-02-05 PROCEDURE — 87491 CHLMYD TRACH DNA AMP PROBE: CPT | Mod: ,,, | Performed by: CLINICAL MEDICAL LABORATORY

## 2025-02-05 PROCEDURE — 3078F DIAST BP <80 MM HG: CPT | Mod: CPTII,,, | Performed by: OBSTETRICS & GYNECOLOGY

## 2025-02-05 PROCEDURE — 99395 PREV VISIT EST AGE 18-39: CPT | Mod: ,,, | Performed by: OBSTETRICS & GYNECOLOGY

## 2025-02-05 PROCEDURE — 81515 NFCT DS BV&VAGINITIS DNA ALG: CPT | Mod: QW,,, | Performed by: CLINICAL MEDICAL LABORATORY

## 2025-02-05 PROCEDURE — 3008F BODY MASS INDEX DOCD: CPT | Mod: CPTII,,, | Performed by: OBSTETRICS & GYNECOLOGY

## 2025-02-05 PROCEDURE — 87591 N.GONORRHOEAE DNA AMP PROB: CPT | Mod: ,,, | Performed by: CLINICAL MEDICAL LABORATORY

## 2025-02-05 PROCEDURE — 88142 CYTOPATH C/V THIN LAYER: CPT | Mod: TC,GCY | Performed by: OBSTETRICS & GYNECOLOGY

## 2025-02-05 PROCEDURE — 3075F SYST BP GE 130 - 139MM HG: CPT | Mod: CPTII,,, | Performed by: OBSTETRICS & GYNECOLOGY

## 2025-02-05 PROCEDURE — 1159F MED LIST DOCD IN RCRD: CPT | Mod: CPTII,,, | Performed by: OBSTETRICS & GYNECOLOGY

## 2025-02-05 RX ORDER — NORETHINDRONE ACETATE AND ETHINYL ESTRADIOL .02; 1 MG/1; MG/1
1 TABLET ORAL DAILY
Qty: 30 TABLET | Refills: 11 | Status: SHIPPED | OUTPATIENT
Start: 2025-02-05 | End: 2026-02-05

## 2025-02-05 NOTE — PROGRESS NOTES
CC: Well woman exam    Connie Cook is a 24 y.o. female  presents for well woman exam.    LMP: No LMP recorded..    Last mammogram: n/a  Last Colonoscopy: n/a    Denies any issues, problems, or complaints.     Past Medical History:   Diagnosis Date    Allergy     Asthma     Screening examination for STD (sexually transmitted disease) 2023     Past Surgical History:   Procedure Laterality Date     SECTION N/A 10/2/2024    Procedure:  SECTION;  Surgeon: Martha Barton MD;  Location: [facility] L&D;  Service: OB/GYN;  Laterality: N/A;    COLPOSCOPY, WITH BIOPSY OF CERVIX       Social History     Socioeconomic History    Marital status: Single    Number of children: 0   Occupational History    Occupation: [employer]     Comment: [employer]   Tobacco Use    Smoking status: Never     Passive exposure: Never    Smokeless tobacco: Never   Substance and Sexual Activity    Alcohol use: Not Currently     Comment: occassional    Drug use: Not Currently     Types: Marijuana     Comment: occassional    Sexual activity: Yes     Partners: Male   Social History Narrative    Lives at home with mother      Social Drivers of Health     Financial Resource Strain: Low Risk  (10/4/2024)    Overall Financial Resource Strain (CARDIA)     Difficulty of Paying Living Expenses: Not hard at all   Food Insecurity: No Food Insecurity (10/4/2024)    Hunger Vital Sign     Worried About Running Out of Food in the Last Year: Never true     Ran Out of Food in the Last Year: Never true   Transportation Needs: No Transportation Needs (10/4/2024)    TRANSPORTATION NEEDS     Transportation : No   Physical Activity: Inactive (10/4/2024)    Exercise Vital Sign     Days of Exercise per Week: 0 days     Minutes of Exercise per Session: 0 min   Stress: No Stress Concern Present (10/4/2024)    [employer] - Occupational Stress Questionnaire     Feeling of Stress : Not at all   Housing Stability:  "Low Risk  (10/4/2024)    Housing Stability Vital Sign     Unable to Pay for Housing in the Last Year: No     Homeless in the Last Year: No     Family History   Problem Relation Name Age of Onset    Diabetes Father      Diabetes Maternal Grandmother      No Known Problems Mother      No Known Problems Sister      No Known Problems Brother      Diabetes Maternal Aunt      No Known Problems Maternal Uncle      No Known Problems Paternal Aunt      Stroke Maternal Grandfather      No Known Problems Paternal Grandmother      No Known Problems Paternal Grandfather       OB History          2    Para   2    Term           2    AB        Living   2         SAB        IAB        Ectopic        Multiple   1    Live Births   2           Obstetric Comments   1 miscarriage-Dec 2020  8 mo gestation/ vaginally delivered, early rupture of membranes               /75 (BP Location: Right arm, Patient Position: Sitting)   Pulse 82   Resp 18   Ht 5' 9" (1.753 m)   Wt 94.1 kg (207 lb 6.4 oz)   BMI 30.63 kg/m²       ROS:  GENERAL: Denies weight gain or weight loss. Feeling well overall.   SKIN: Denies rash or lesions.   HEAD: Denies head injury or headache.   NODES: Denies enlarged lymph nodes.   CHEST: Denies chest pain or shortness of breath.   CARDIOVASCULAR: Denies palpitations or left sided chest pain.   ABDOMEN: No abdominal pain, constipation, diarrhea, nausea, vomiting or rectal bleeding.   URINARY: No frequency, dysuria, hematuria, or burning on urination.  REPRODUCTIVE: See HPI.   BREASTS: The patient performs breast self-examination and denies pain, lumps, or nipple discharge.   HEMATOLOGIC: No easy bruisability or excessive bleeding.   MUSCULOSKELETAL: Denies joint pain or swelling.   NEUROLOGIC: Denies syncope or weakness.   PSYCHIATRIC: Denies depression, anxiety or mood swings.    PHYSICAL EXAM:  APPEARANCE: Well nourished, well developed, in no acute distress.  AFFECT: WNL, alert and oriented x " 3  SKIN: No acne or hirsutism  NECK: Neck symmetric without masses or thyromegaly  NODES: No inguinal, cervical, axillary, or femoral lymph node enlargement  CHEST: Good respiratory effect  ABDOMEN: Soft.  No tenderness or masses.  No hepatosplenomegaly.  No hernias.  BREASTS: Symmetrical, no skin changes or visible lesions.  No palpable masses, nipple discharge bilaterally.  PELVIC: Normal external genitalia without lesions.  Normal hair distribution.  Adequate perineal body, normal urethral meatus.  Vagina moist and well rugated without lesions or discharge.  Cervix pink, without lesions, discharge or tenderness.  No significant cystocele or rectocele.  Bimanual exam shows uterus to be normal size, regular, mobile and nontender.  Adnexa without masses or tenderness.    EXTREMITIES: No edema.    Cervical cancer screening  -     ThinPrep Pap Test; Future; Expected date: 02/05/2025    Routine gynecological examination  -     ThinPrep Pap Test; Future; Expected date: 02/05/2025    Screen for STD (sexually transmitted disease)  -     Chlamydia/GC, PCR; Future; Expected date: 02/05/2025  -     Treponema Pallidum (Syphillis) Antibody; Future; Expected date: 02/05/2025  -     HIV 1/2 Ag/Ab (4th Gen); Future; Expected date: 02/05/2025  -     Hepatitis Panel, Acute; Future; Expected date: 02/05/2025  -     Vaginosis Screen by DNA Probe; Future; Expected date: 02/05/2025    High risk sexual behavior, unspecified type  -     Chlamydia/GC, PCR; Future; Expected date: 02/05/2025  -     Treponema Pallidum (Syphillis) Antibody; Future; Expected date: 02/05/2025  -     HIV 1/2 Ag/Ab (4th Gen); Future; Expected date: 02/05/2025  -     Hepatitis Panel, Acute; Future; Expected date: 02/05/2025  -     Vaginosis Screen by DNA Probe; Future; Expected date: 02/05/2025    Encounter for screening for HIV  -     HIV 1/2 Ag/Ab (4th Gen); Future; Expected date: 02/05/2025    Encounter for initial prescription of contraceptive pills  -      norethindrone-ethinyl estradiol (MICROGESTIN 1/20) 1-20 mg-mcg per tablet; Take 1 tablet by mouth once daily.  Dispense: 30 tablet; Refill: 11            Patient was counseled today on A.C.S. Pap guidelines and recommendations for yearly pelvic exams, mammograms and monthly self breast exams; to see her PCP for other health maintenance.   Exercise regimen encouraged  Healthy food choices encouraged  Questions answered to desired level of satisfaction  Verbalized understanding to all information and instructions    Follow up in about 1 year (around 2/5/2026) for annual.      Martha Barton M.D., FCOG    OB/GYN

## 2025-02-07 LAB
GH SERPL-MCNC: NORMAL NG/ML
INSULIN SERPL-ACNC: NORMAL U[IU]/ML
LAB AP CLINICAL INFORMATION: NORMAL
LAB AP GYN INTERPRETATION: NEGATIVE
LAB AP PAP DISCLAIMER COMMENTS: NORMAL
RENIN PLAS-CCNC: NORMAL NG/ML/H

## (undated) DEVICE — SUT VICRYL PLUS 1 CTX 36IN

## (undated) DEVICE — GLOVE PROTEXIS PI SYN SURG 6.5

## (undated) DEVICE — STAPLER SKIN SUBCUTICULAR

## (undated) DEVICE — CANISTER 1200 SUCTION CCMEDI-V

## (undated) DEVICE — SOL NACL IRR 1000ML BTL

## (undated) DEVICE — STRIP MEDI WND CLSR 1/4X4IN

## (undated) DEVICE — GLOVE PROTEXIS PI SYN SURG 7

## (undated) DEVICE — HEMOSTAT SURGICEL 4X8IN

## (undated) DEVICE — SUT 2/0 27IN PLAIN GUT CT

## (undated) DEVICE — PACK C SECTION RUSH

## (undated) DEVICE — ELECTRODE REM PLYHSV RETURN 9

## (undated) DEVICE — APPLICATOR CHLORAPREP ORN 26ML